# Patient Record
Sex: FEMALE | Race: WHITE | Employment: FULL TIME | URBAN - METROPOLITAN AREA
[De-identification: names, ages, dates, MRNs, and addresses within clinical notes are randomized per-mention and may not be internally consistent; named-entity substitution may affect disease eponyms.]

---

## 2017-01-01 ENCOUNTER — OFFICE VISIT (OUTPATIENT)
Dept: INTERNAL MEDICINE | Age: 57
End: 2017-01-01

## 2017-01-01 VITALS
WEIGHT: 228 LBS | TEMPERATURE: 97.8 F | OXYGEN SATURATION: 98 % | RESPIRATION RATE: 20 BRPM | HEIGHT: 62 IN | SYSTOLIC BLOOD PRESSURE: 118 MMHG | HEART RATE: 71 BPM | BODY MASS INDEX: 41.96 KG/M2 | DIASTOLIC BLOOD PRESSURE: 72 MMHG

## 2017-01-01 DIAGNOSIS — L20.89 OTHER ATOPIC DERMATITIS: ICD-10-CM

## 2017-01-01 DIAGNOSIS — Z13.1 ENCOUNTER FOR SCREENING FOR DIABETES MELLITUS: ICD-10-CM

## 2017-01-01 DIAGNOSIS — Z23 NEED FOR PROPHYLACTIC VACCINATION AGAINST STREPTOCOCCUS PNEUMONIAE (PNEUMOCOCCUS): ICD-10-CM

## 2017-01-01 DIAGNOSIS — Z12.31 ENCOUNTER FOR SCREENING MAMMOGRAM FOR BREAST CANCER: ICD-10-CM

## 2017-01-01 DIAGNOSIS — Z12.11 SCREEN FOR COLON CANCER: Primary | ICD-10-CM

## 2017-01-01 DIAGNOSIS — B35.4 TINEA CORPORIS: ICD-10-CM

## 2017-01-01 DIAGNOSIS — M54.42 ACUTE LEFT-SIDED LOW BACK PAIN WITH LEFT-SIDED SCIATICA: ICD-10-CM

## 2017-01-01 DIAGNOSIS — R73.09 ELEVATED GLUCOSE LEVEL: ICD-10-CM

## 2017-01-01 DIAGNOSIS — Z87.891 PERSONAL HISTORY OF TOBACCO USE: ICD-10-CM

## 2017-01-01 DIAGNOSIS — F17.219 NICOTINE DEPENDENCE, CIGARETTES, WITH UNSPECIFIED NICOTINE-INDUCED DISORDERS: ICD-10-CM

## 2017-01-01 LAB
GLUCOSE FASTING: 108 MG/DL (ref 74–109)
HBA1C MFR BLD: 6.9 % (ref 4.8–5.9)

## 2017-01-01 PROCEDURE — G8482 FLU IMMUNIZE ORDER/ADMIN: HCPCS | Performed by: PHYSICIAN ASSISTANT

## 2017-01-01 PROCEDURE — 3014F SCREEN MAMMO DOC REV: CPT | Performed by: PHYSICIAN ASSISTANT

## 2017-01-01 PROCEDURE — 4004F PT TOBACCO SCREEN RCVD TLK: CPT | Performed by: PHYSICIAN ASSISTANT

## 2017-01-01 PROCEDURE — G8427 DOCREV CUR MEDS BY ELIG CLIN: HCPCS | Performed by: PHYSICIAN ASSISTANT

## 2017-01-01 PROCEDURE — 99214 OFFICE O/P EST MOD 30 MIN: CPT | Performed by: PHYSICIAN ASSISTANT

## 2017-01-01 PROCEDURE — G8417 CALC BMI ABV UP PARAM F/U: HCPCS | Performed by: PHYSICIAN ASSISTANT

## 2017-01-01 PROCEDURE — G0296 VISIT TO DETERM LDCT ELIG: HCPCS | Performed by: PHYSICIAN ASSISTANT

## 2017-01-01 PROCEDURE — 3017F COLORECTAL CA SCREEN DOC REV: CPT | Performed by: PHYSICIAN ASSISTANT

## 2017-01-01 RX ORDER — HYDROCODONE BITARTRATE AND ACETAMINOPHEN 5; 325 MG/1; MG/1
1 TABLET ORAL EVERY 6 HOURS PRN
Qty: 20 TABLET | Refills: 0 | Status: SHIPPED | OUTPATIENT
Start: 2017-01-01 | End: 2017-01-01

## 2017-01-01 RX ORDER — METHYLPREDNISOLONE 4 MG/1
TABLET ORAL
Qty: 1 KIT | Refills: 0 | Status: SHIPPED | OUTPATIENT
Start: 2017-01-01 | End: 2017-01-01

## 2017-01-01 RX ORDER — NYSTATIN 100000 U/G
CREAM TOPICAL
Qty: 45 G | Refills: 1 | Status: SHIPPED | OUTPATIENT
Start: 2017-01-01 | End: 2018-01-01 | Stop reason: ALTCHOICE

## 2017-01-01 ASSESSMENT — ENCOUNTER SYMPTOMS
SINUS PAIN: 0
SORE THROAT: 0
VOMITING: 0
NAUSEA: 0
COUGH: 1
DOUBLE VISION: 0
BACK PAIN: 1
EYE PAIN: 0
WHEEZING: 1
ABDOMINAL PAIN: 0
DIARRHEA: 0

## 2017-01-01 ASSESSMENT — PATIENT HEALTH QUESTIONNAIRE - PHQ9
SUM OF ALL RESPONSES TO PHQ9 QUESTIONS 1 & 2: 0
SUM OF ALL RESPONSES TO PHQ QUESTIONS 1-9: 0
1. LITTLE INTEREST OR PLEASURE IN DOING THINGS: 0
2. FEELING DOWN, DEPRESSED OR HOPELESS: 0

## 2017-12-06 NOTE — PROGRESS NOTES
Subjective  Versa [place], 62 y.o. female presents today with:  Chief Complaint   Patient presents with    Back Pain     Patient here following up from [facility] 12/2/17. Left sided. Rx naproxen and flexeril  Patient states she is still having pain.  Leg Pain     burning and stinging pain on right thigh, state over 1 year. Now it is consent.  Health Maintenance     FIT, GLucose, Pneumonia Vaccine and mammogram, CT lung       HPI   Pt is here for urgent care f.u on low back pain with radiation to left le. rx'd muscle relaxer and naproxen. She presently grades her pain 6 out of 10. Denies buckling of her legs. 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 States she is not ready to quit  Complaining of a red itchy rash on her right anterior thigh and beneath her breast  Review of Systems   Constitutional: Negative for diaphoresis and weight loss. HENT: Negative for congestion, sinus pain and sore throat. Eyes: Negative for double vision and pain. Respiratory: Positive for cough and wheezing. Cardiovascular: Negative for chest pain and palpitations. Gastrointestinal: Negative for abdominal pain, diarrhea, nausea and vomiting. Genitourinary: Negative for dysuria and frequency. Musculoskeletal: Positive for back pain and joint pain. Skin: Positive for rash. Negative for itching. Neurological: Positive for tingling. Negative for dizziness, tremors and focal weakness. Endo/Heme/Allergies: Negative for environmental allergies. Psychiatric/Behavioral: Negative for depression. The patient is not nervous/anxious.           Past Medical History:   Diagnosis Date    Obesity      Past Surgical History:   Procedure Laterality Date    CARPAL TUNNEL RELEASE Left 04/18/16    Eduardo Oneal MD    CARPAL TUNNEL RELEASE Right 5/9/16    NORMA    CHOLECYSTECTOMY  2008    NASAL POLYP and atraumatic. Eyes: Conjunctivae and EOM are normal. Pupils are equal, round, and reactive to light. Neck: Normal range of motion. Neck supple. Cardiovascular: Normal rate, regular rhythm and normal heart sounds. Pulmonary/Chest: Effort normal and breath sounds normal.   Abdominal: Soft. Bowel sounds are normal.   Musculoskeletal: She exhibits tenderness. She exhibits no edema. Lumbar back: She exhibits tenderness, bony tenderness and spasm. Neurological: She is alert and oriented to person, place, and time. Gait normal.   Skin: Skin is warm and dry. Rash noted. There is erythema. Psychiatric: Mood, memory, affect and judgment normal.            Assessment & Plan   1. Screen for colon cancer  POCT Fecal Immunochemical Test (FIT)   2. Encounter for screening mammogram for breast cancer  SADIE Digital Screen Bilateral [[id number]]   3. Encounter for screening for diabetes mellitus  Glucose, Fasting   4. Need for prophylactic vaccination against Streptococcus pneumoniae (pneumococcus)  CANCELED: Pneumococcal polysaccharide vaccine 23-valent PPSV23   5. Nicotine dependence, cigarettes, with unspecified nicotine-induced disorders  NV VISIT TO DISCUSS LUNG CA SCREEN W LDCT    CT Lung Screening   6. Personal history of tobacco use  NV VISIT TO DISCUSS LUNG CA SCREEN W LDCT    CT Lung Screening   7. Other atopic dermatitis     8. Tinea corporis     9. Acute left-sided low back pain with left-sided sciatica  XR LUMBAR SPINE (MIN 4 VIEWS)   10. Elevated glucose level  Hemoglobin A1C       Low Dose CT (LDCT) Lung Screening criteria met   Age 50-69   Pack year smoking >30   Still smoking or less than 15 year since quit   No sign or symptoms of lung cancer   > 11 months since last LDCT     Risks and benefits of lung cancer screening with LDCT scans discussed:    Significance of positive screen - False-positive LDCT results often occur. 95% of all positive results do not lead to a diagnosis of cancer.  Usually LUMBAR SPINE (MIN 4 VIEWS)     Standing Status:   Future     Standing Expiration Date:   12/6/2018     Order Specific Question:   Reason for exam:     Answer:   pain    Glucose, Fasting     Standing Status:   Future     Standing Expiration Date:   12/6/2018    Hemoglobin A1C     Standing Status:   Future     Standing Expiration Date:   12/6/2018    POCT Fecal Immunochemical Test (FIT)     Standing Status:   Future     Standing Expiration Date:   12/6/2018    MD VISIT TO DISCUSS LUNG CA SCREEN W LDCT     Orders Placed This Encounter   Medications    hydrocortisone 2.5 % cream     Sig: Apply topically 2 times daily. Dispense:  45 g     Refill:  1    nystatin (MYCOSTATIN) 088797 UNIT/GM cream     Sig: Apply topically 2 times daily. Dispense:  45 g     Refill:  1    methylPREDNISolone (MEDROL DOSEPACK) 4 MG tablet     Sig: Take by mouth. Dispense:  1 kit     Refill:  0    HYDROcodone-acetaminophen (NORCO) 5-325 MG per tablet     Sig: Take 1 tablet by mouth every 6 hours as needed for Pain . Dispense:  20 tablet     Refill:  0     There are no discontinued medications. Return in about 6 months (around 6/6/2018), or if symptoms worsen or fail to improve, for call for results.     Sarah Garcia PA-C

## 2018-01-01 ENCOUNTER — APPOINTMENT (OUTPATIENT)
Dept: CT IMAGING | Age: 58
End: 2018-01-01
Payer: COMMERCIAL

## 2018-01-01 ENCOUNTER — HOSPITAL ENCOUNTER (OUTPATIENT)
Dept: CT IMAGING | Age: 58
Discharge: HOME | DRG: 952 | End: 2018-03-10
Payer: COMMERCIAL

## 2018-01-01 ENCOUNTER — HOSPITAL ENCOUNTER (OUTPATIENT)
Dept: CT IMAGING | Age: 58
Discharge: HOME | End: 2018-01-12
Payer: COMMERCIAL

## 2018-01-01 ENCOUNTER — TELEPHONE (OUTPATIENT)
Dept: INTERNAL MEDICINE CLINIC | Age: 58
End: 2018-01-01

## 2018-01-01 ENCOUNTER — INITIAL CONSULT (OUTPATIENT)
Dept: INTERVENTIONAL RADIOLOGY/VASCULAR | Age: 58
End: 2018-01-01
Payer: COMMERCIAL

## 2018-01-01 ENCOUNTER — HOSPITAL ENCOUNTER (OUTPATIENT)
Age: 58
Setting detail: OUTPATIENT SURGERY
Discharge: HOME | End: 2018-02-08
Attending: SPECIALIST | Admitting: SPECIALIST
Payer: COMMERCIAL

## 2018-01-01 ENCOUNTER — APPOINTMENT (OUTPATIENT)
Dept: GENERAL RADIOLOGY | Age: 58
End: 2018-01-01
Payer: COMMERCIAL

## 2018-01-01 ENCOUNTER — HOSPITAL ENCOUNTER (OUTPATIENT)
Dept: WOMENS IMAGING | Age: 58
Discharge: HOME | End: 2018-01-12
Payer: COMMERCIAL

## 2018-01-01 ENCOUNTER — HOSPITAL ENCOUNTER (OUTPATIENT)
Dept: GENERAL RADIOLOGY | Age: 58
Discharge: HOME | End: 2018-01-12
Payer: COMMERCIAL

## 2018-01-01 ENCOUNTER — APPOINTMENT (OUTPATIENT)
Dept: CT IMAGING | Age: 58
DRG: 952 | End: 2018-01-01
Payer: COMMERCIAL

## 2018-01-01 ENCOUNTER — APPOINTMENT (OUTPATIENT)
Dept: GENERAL RADIOLOGY | Age: 58
DRG: 952 | End: 2018-01-01
Payer: COMMERCIAL

## 2018-01-01 ENCOUNTER — TELEPHONE (OUTPATIENT)
Dept: CASE MANAGEMENT | Age: 58
End: 2018-01-01

## 2018-01-01 ENCOUNTER — HOSPITAL ENCOUNTER (EMERGENCY)
Age: 58
Discharge: HOME | End: 2018-01-23
Payer: COMMERCIAL

## 2018-01-01 ENCOUNTER — ANESTHESIA (OUTPATIENT)
Dept: ENDOSCOPY | Age: 58
End: 2018-01-01
Payer: COMMERCIAL

## 2018-01-01 ENCOUNTER — OFFICE VISIT (OUTPATIENT)
Dept: INTERNAL MEDICINE | Age: 58
End: 2018-01-01

## 2018-01-01 ENCOUNTER — HOSPITAL ENCOUNTER (OUTPATIENT)
Dept: ULTRASOUND IMAGING | Age: 58
Discharge: HOME | DRG: 952 | End: 2018-03-11
Payer: COMMERCIAL

## 2018-01-01 ENCOUNTER — HOSPITAL ENCOUNTER (INPATIENT)
Age: 58
LOS: 4 days | Discharge: HOSPICE | DRG: 952 | End: 2018-03-15
Attending: STUDENT IN AN ORGANIZED HEALTH CARE EDUCATION/TRAINING PROGRAM | Admitting: INTERNAL MEDICINE
Payer: COMMERCIAL

## 2018-01-01 ENCOUNTER — OFFICE VISIT (OUTPATIENT)
Dept: INTERNAL MEDICINE CLINIC | Age: 58
End: 2018-01-01
Payer: COMMERCIAL

## 2018-01-01 ENCOUNTER — HOSPITAL ENCOUNTER (OUTPATIENT)
Dept: CT IMAGING | Age: 58
Discharge: HOME | DRG: 952 | End: 2018-03-11
Payer: COMMERCIAL

## 2018-01-01 ENCOUNTER — HOSPITAL ENCOUNTER (EMERGENCY)
Age: 58
Discharge: HOME | End: 2018-02-20
Attending: EMERGENCY MEDICINE
Payer: COMMERCIAL

## 2018-01-01 ENCOUNTER — ANESTHESIA EVENT (OUTPATIENT)
Dept: ENDOSCOPY | Age: 58
End: 2018-01-01
Payer: COMMERCIAL

## 2018-01-01 VITALS
RESPIRATION RATE: 20 BRPM | BODY MASS INDEX: 42.1 KG/M2 | OXYGEN SATURATION: 98 % | HEART RATE: 78 BPM | TEMPERATURE: 97.6 F | HEIGHT: 61 IN | SYSTOLIC BLOOD PRESSURE: 144 MMHG | WEIGHT: 223 LBS | DIASTOLIC BLOOD PRESSURE: 68 MMHG

## 2018-01-01 VITALS
OXYGEN SATURATION: 95 % | BODY MASS INDEX: 38.64 KG/M2 | WEIGHT: 210 LBS | HEIGHT: 62 IN | TEMPERATURE: 97.8 F | DIASTOLIC BLOOD PRESSURE: 70 MMHG | SYSTOLIC BLOOD PRESSURE: 128 MMHG | RESPIRATION RATE: 16 BRPM | HEART RATE: 92 BPM

## 2018-01-01 VITALS
RESPIRATION RATE: 24 BRPM | DIASTOLIC BLOOD PRESSURE: 76 MMHG | BODY MASS INDEX: 39.92 KG/M2 | OXYGEN SATURATION: 97 % | TEMPERATURE: 97.7 F | SYSTOLIC BLOOD PRESSURE: 147 MMHG | HEART RATE: 97 BPM | WEIGHT: 211.42 LBS | HEIGHT: 61 IN

## 2018-01-01 VITALS
OXYGEN SATURATION: 97 % | DIASTOLIC BLOOD PRESSURE: 78 MMHG | SYSTOLIC BLOOD PRESSURE: 128 MMHG | TEMPERATURE: 98.1 F | WEIGHT: 225 LBS | BODY MASS INDEX: 41.41 KG/M2 | HEART RATE: 79 BPM | RESPIRATION RATE: 16 BRPM | HEIGHT: 62 IN

## 2018-01-01 VITALS
OXYGEN SATURATION: 97 % | HEART RATE: 90 BPM | SYSTOLIC BLOOD PRESSURE: 112 MMHG | BODY MASS INDEX: 39.04 KG/M2 | RESPIRATION RATE: 16 BRPM | WEIGHT: 210 LBS | DIASTOLIC BLOOD PRESSURE: 82 MMHG

## 2018-01-01 VITALS
OXYGEN SATURATION: 99 % | RESPIRATION RATE: 18 BRPM | TEMPERATURE: 98.6 F | DIASTOLIC BLOOD PRESSURE: 49 MMHG | HEART RATE: 70 BPM | BODY MASS INDEX: 40.22 KG/M2 | WEIGHT: 213 LBS | HEIGHT: 61 IN | SYSTOLIC BLOOD PRESSURE: 117 MMHG

## 2018-01-01 VITALS
WEIGHT: 210 LBS | DIASTOLIC BLOOD PRESSURE: 95 MMHG | RESPIRATION RATE: 16 BRPM | SYSTOLIC BLOOD PRESSURE: 139 MMHG | HEART RATE: 111 BPM | HEIGHT: 62 IN | BODY MASS INDEX: 38.64 KG/M2

## 2018-01-01 VITALS
OXYGEN SATURATION: 99 % | RESPIRATION RATE: 17 BRPM | DIASTOLIC BLOOD PRESSURE: 64 MMHG | SYSTOLIC BLOOD PRESSURE: 110 MMHG

## 2018-01-01 VITALS
BODY MASS INDEX: 38.83 KG/M2 | SYSTOLIC BLOOD PRESSURE: 117 MMHG | DIASTOLIC BLOOD PRESSURE: 77 MMHG | TEMPERATURE: 98.2 F | OXYGEN SATURATION: 97 % | WEIGHT: 211 LBS | RESPIRATION RATE: 18 BRPM | HEIGHT: 62 IN | HEART RATE: 77 BPM

## 2018-01-01 VITALS
RESPIRATION RATE: 24 BRPM | BODY MASS INDEX: 39.04 KG/M2 | TEMPERATURE: 97.5 F | HEART RATE: 94 BPM | DIASTOLIC BLOOD PRESSURE: 79 MMHG | OXYGEN SATURATION: 96 % | WEIGHT: 210 LBS | SYSTOLIC BLOOD PRESSURE: 146 MMHG

## 2018-01-01 VITALS
RESPIRATION RATE: 18 BRPM | SYSTOLIC BLOOD PRESSURE: 117 MMHG | HEART RATE: 79 BPM | DIASTOLIC BLOOD PRESSURE: 79 MMHG | WEIGHT: 223 LBS | BODY MASS INDEX: 41.04 KG/M2 | HEIGHT: 62 IN

## 2018-01-01 DIAGNOSIS — R73.09 ELEVATED GLUCOSE LEVEL: ICD-10-CM

## 2018-01-01 DIAGNOSIS — C80.1 CANCER (HCC): ICD-10-CM

## 2018-01-01 DIAGNOSIS — M54.42 ACUTE LEFT-SIDED LOW BACK PAIN WITH LEFT-SIDED SCIATICA: ICD-10-CM

## 2018-01-01 DIAGNOSIS — E87.1 HYPONATREMIA: Primary | ICD-10-CM

## 2018-01-01 DIAGNOSIS — G89.29 CHRONIC ABDOMINAL PAIN: ICD-10-CM

## 2018-01-01 DIAGNOSIS — J20.9 ACUTE BRONCHITIS, UNSPECIFIED ORGANISM: Primary | ICD-10-CM

## 2018-01-01 DIAGNOSIS — R92.8 ABNORMAL MAMMOGRAM OF LEFT BREAST: Primary | ICD-10-CM

## 2018-01-01 DIAGNOSIS — R10.9 ABDOMINAL PAIN, UNSPECIFIED ABDOMINAL LOCATION: Primary | ICD-10-CM

## 2018-01-01 DIAGNOSIS — Z12.31 ENCOUNTER FOR SCREENING MAMMOGRAM FOR BREAST CANCER: ICD-10-CM

## 2018-01-01 DIAGNOSIS — R79.89 ELEVATED LACTIC ACID LEVEL: ICD-10-CM

## 2018-01-01 DIAGNOSIS — R59.9 ENLARGED LYMPH NODE: ICD-10-CM

## 2018-01-01 DIAGNOSIS — R16.0 LIVER MASS: ICD-10-CM

## 2018-01-01 DIAGNOSIS — K74.60 CIRRHOSIS OF LIVER WITHOUT ASCITES, UNSPECIFIED HEPATIC CIRRHOSIS TYPE (HCC): Primary | ICD-10-CM

## 2018-01-01 DIAGNOSIS — Z87.891 PERSONAL HISTORY OF TOBACCO USE: ICD-10-CM

## 2018-01-01 DIAGNOSIS — K27.9 PUD (PEPTIC ULCER DISEASE): ICD-10-CM

## 2018-01-01 DIAGNOSIS — R59.1 LYMPHADENOPATHY: ICD-10-CM

## 2018-01-01 DIAGNOSIS — F17.219 NICOTINE DEPENDENCE, CIGARETTES, WITH UNSPECIFIED NICOTINE-INDUCED DISORDERS: ICD-10-CM

## 2018-01-01 DIAGNOSIS — E80.6 HYPERBILIRUBINEMIA: ICD-10-CM

## 2018-01-01 DIAGNOSIS — R11.2 NON-INTRACTABLE VOMITING WITH NAUSEA, UNSPECIFIED VOMITING TYPE: ICD-10-CM

## 2018-01-01 DIAGNOSIS — E86.0 DEHYDRATION: ICD-10-CM

## 2018-01-01 DIAGNOSIS — M54.42 ACUTE LEFT-SIDED LOW BACK PAIN WITH LEFT-SIDED SCIATICA: Primary | ICD-10-CM

## 2018-01-01 DIAGNOSIS — R63.8 POOR FLUID INTAKE: ICD-10-CM

## 2018-01-01 DIAGNOSIS — K56.41 FECAL IMPACTION OF COLON (HCC): ICD-10-CM

## 2018-01-01 DIAGNOSIS — R10.9 CHRONIC ABDOMINAL PAIN: ICD-10-CM

## 2018-01-01 DIAGNOSIS — R16.0 LIVER MASSES: ICD-10-CM

## 2018-01-01 LAB
AFP-TUMOR MARKER: 2 NG/ML (ref 0–9)
ALBUMIN SERPL-MCNC: 2.5 G/DL (ref 3.9–4.9)
ALBUMIN SERPL-MCNC: 2.8 G/DL (ref 3.9–4.9)
ALBUMIN SERPL-MCNC: 2.8 G/DL (ref 3.9–4.9)
ALBUMIN SERPL-MCNC: 3.1 G/DL (ref 3.9–4.9)
ALBUMIN SERPL-MCNC: 3.1 G/DL (ref 3.9–4.9)
ALBUMIN SERPL-MCNC: 3.4 G/DL (ref 3.9–4.9)
ALBUMIN SERPL-MCNC: 3.7 G/DL (ref 3.9–4.9)
ALBUMIN SERPL-MCNC: 3.8 G/DL (ref 3.9–4.9)
ALP BLD-CCNC: 215 U/L (ref 40–130)
ALP BLD-CCNC: 245 U/L (ref 40–130)
ALP BLD-CCNC: 266 U/L (ref 40–130)
ALP BLD-CCNC: 273 U/L (ref 40–130)
ALP BLD-CCNC: 308 U/L (ref 40–130)
ALP BLD-CCNC: 310 U/L (ref 40–130)
ALP BLD-CCNC: 323 U/L (ref 40–130)
ALP BLD-CCNC: 334 U/L (ref 40–130)
ALPHA-1 ANTITRYPSIN: 222 MG/DL (ref 90–200)
ALT SERPL-CCNC: 102 U/L (ref 0–33)
ALT SERPL-CCNC: 103 U/L (ref 0–33)
ALT SERPL-CCNC: 121 U/L (ref 0–33)
ALT SERPL-CCNC: 178 U/L (ref 0–33)
ALT SERPL-CCNC: 92 U/L (ref 0–33)
ALT SERPL-CCNC: 96 U/L (ref 0–33)
ALT SERPL-CCNC: 96 U/L (ref 0–33)
ALT SERPL-CCNC: 98 U/L (ref 0–33)
AMMONIA: ABNORMAL UMOL/L (ref 11–51)
ANA INTERPRETATION: NORMAL
ANION GAP SERPL CALCULATED.3IONS-SCNC: 13 MEQ/L (ref 7–13)
ANION GAP SERPL CALCULATED.3IONS-SCNC: 17 MEQ/L (ref 7–13)
ANION GAP SERPL CALCULATED.3IONS-SCNC: 20 MEQ/L (ref 7–13)
ANION GAP SERPL CALCULATED.3IONS-SCNC: 21 MEQ/L (ref 7–13)
ANION GAP SERPL CALCULATED.3IONS-SCNC: 21 MEQ/L (ref 7–13)
ANION GAP SERPL CALCULATED.3IONS-SCNC: 25 MEQ/L (ref 7–13)
ANION GAP SERPL CALCULATED.3IONS-SCNC: 32 MEQ/L (ref 7–13)
ANTI-NUCLEAR ANTIBODY (ANA): NEGATIVE
APTT: 51.1 SEC (ref 21.6–35.4)
APTT: 58.8 SEC (ref 21.6–35.4)
AST SERPL-CCNC: 108 U/L (ref 0–35)
AST SERPL-CCNC: 139 U/L (ref 0–35)
AST SERPL-CCNC: 237 U/L (ref 0–35)
AST SERPL-CCNC: 414 U/L (ref 0–35)
AST SERPL-CCNC: 436 U/L (ref 0–35)
AST SERPL-CCNC: 462 U/L (ref 0–35)
AST SERPL-CCNC: 516 U/L (ref 0–35)
AST SERPL-CCNC: 923 U/L (ref 0–35)
BACTERIA: ABNORMAL /HPF
BASOPHILS ABSOLUTE: 0 K/UL (ref 0–0.2)
BASOPHILS ABSOLUTE: 0.1 K/UL (ref 0–0.2)
BASOPHILS ABSOLUTE: 0.1 K/UL (ref 0–0.2)
BASOPHILS RELATIVE PERCENT: 0 %
BASOPHILS RELATIVE PERCENT: 0.1 %
BASOPHILS RELATIVE PERCENT: 0.3 %
BASOPHILS RELATIVE PERCENT: 0.6 %
BASOPHILS RELATIVE PERCENT: 0.8 %
BASOPHILS RELATIVE PERCENT: 0.9 %
BILIRUB SERPL-MCNC: 0.7 MG/DL (ref 0–1.2)
BILIRUB SERPL-MCNC: 0.9 MG/DL (ref 0–1.2)
BILIRUB SERPL-MCNC: 25.4 MG/DL (ref 0–1.2)
BILIRUB SERPL-MCNC: 26.1 MG/DL (ref 0–1.2)
BILIRUB SERPL-MCNC: 26.1 MG/DL (ref 0–1.2)
BILIRUB SERPL-MCNC: 3.1 MG/DL (ref 0–1.2)
BILIRUB SERPL-MCNC: 31.5 MG/DL (ref 0–1.2)
BILIRUB SERPL-MCNC: 37.2 MG/DL (ref 0–1.2)
BILIRUBIN DIRECT: 0.5 MG/DL (ref 0–0.3)
BILIRUBIN DIRECT: 19.6 MG/DL (ref 0–0.3)
BILIRUBIN DIRECT: 2.2 MG/DL (ref 0–0.3)
BILIRUBIN URINE: ABNORMAL
BILIRUBIN, INDIRECT: 0.4 MG/DL (ref 0–0.6)
BILIRUBIN, INDIRECT: 0.9 MG/DL (ref 0–0.6)
BILIRUBIN, INDIRECT: 5.8 MG/DL (ref 0–0.6)
BLOOD CULTURE, ROUTINE: NORMAL
BLOOD, URINE: NEGATIVE
BUN BLDV-MCNC: 13 MG/DL (ref 6–20)
BUN BLDV-MCNC: 13 MG/DL (ref 6–20)
BUN BLDV-MCNC: 14 MG/DL (ref 6–20)
BUN BLDV-MCNC: 14 MG/DL (ref 6–20)
BUN BLDV-MCNC: 16 MG/DL (ref 6–20)
BUN BLDV-MCNC: 19 MG/DL (ref 6–20)
BUN BLDV-MCNC: 34 MG/DL (ref 6–20)
CA 19-9: 8514 U/ML (ref 0–37)
CA 27.29: 1705.6 U/ML (ref 0–40)
CALCIUM SERPL-MCNC: 10.1 MG/DL (ref 8.6–10.2)
CALCIUM SERPL-MCNC: 8.2 MG/DL (ref 8.6–10.2)
CALCIUM SERPL-MCNC: 8.4 MG/DL (ref 8.6–10.2)
CALCIUM SERPL-MCNC: 9.1 MG/DL (ref 8.6–10.2)
CALCIUM SERPL-MCNC: 9.1 MG/DL (ref 8.6–10.2)
CALCIUM SERPL-MCNC: 9.2 MG/DL (ref 8.6–10.2)
CALCIUM SERPL-MCNC: 9.3 MG/DL (ref 8.6–10.2)
CASTS 2: ABNORMAL /LPF
CASTS: ABNORMAL /LPF
CEA: 1967 NG/ML (ref 0–5.5)
CERULOPLASMIN: 34 MG/DL (ref 17–54)
CHLORIDE BLD-SCNC: 102 MEQ/L (ref 98–107)
CHLORIDE BLD-SCNC: 86 MEQ/L (ref 98–107)
CHLORIDE BLD-SCNC: 88 MEQ/L (ref 98–107)
CHLORIDE BLD-SCNC: 90 MEQ/L (ref 98–107)
CHLORIDE BLD-SCNC: 93 MEQ/L (ref 98–107)
CHLORIDE BLD-SCNC: 93 MEQ/L (ref 98–107)
CHLORIDE BLD-SCNC: 96 MEQ/L (ref 98–107)
CLARITY: ABNORMAL
CO2: 12 MEQ/L (ref 22–29)
CO2: 16 MEQ/L (ref 22–29)
CO2: 17 MEQ/L (ref 22–29)
CO2: 18 MEQ/L (ref 22–29)
CO2: 21 MEQ/L (ref 22–29)
CO2: 23 MEQ/L (ref 22–29)
CO2: 9 MEQ/L (ref 22–29)
COLOR: ABNORMAL
COPPER: 143 UG/DL (ref 80–155)
CREAT SERPL-MCNC: 0.46 MG/DL (ref 0.5–0.9)
CREAT SERPL-MCNC: 0.47 MG/DL (ref 0.5–0.9)
CREAT SERPL-MCNC: 0.51 MG/DL (ref 0.5–0.9)
CREAT SERPL-MCNC: 0.55 MG/DL (ref 0.5–0.9)
CREAT SERPL-MCNC: 0.75 MG/DL (ref 0.5–0.9)
CREATININE URINE: 120 MG/DL
CULTURE, BLOOD 2: NORMAL
EOSINOPHILS ABSOLUTE: 0 K/UL (ref 0–0.7)
EOSINOPHILS ABSOLUTE: 0.1 K/UL (ref 0–0.7)
EOSINOPHILS ABSOLUTE: 0.1 K/UL (ref 0–0.7)
EOSINOPHILS ABSOLUTE: 0.3 K/UL (ref 0–0.7)
EOSINOPHILS RELATIVE PERCENT: 0.2 %
EOSINOPHILS RELATIVE PERCENT: 0.2 %
EOSINOPHILS RELATIVE PERCENT: 0.4 %
EOSINOPHILS RELATIVE PERCENT: 0.8 %
EOSINOPHILS RELATIVE PERCENT: 1.7 %
EOSINOPHILS RELATIVE PERCENT: 4.1 %
EPITHELIAL CELLS, UA: ABNORMAL /HPF
FERRITIN: 450.5 NG/ML (ref 13–150)
GFR AFRICAN AMERICAN: >60
GFR NON-AFRICAN AMERICAN: >60
GLOBULIN: 2.7 G/DL (ref 2.3–3.5)
GLOBULIN: 2.7 G/DL (ref 2.3–3.5)
GLOBULIN: 2.8 G/DL (ref 2.3–3.5)
GLOBULIN: 2.8 G/DL (ref 2.3–3.5)
GLOBULIN: 3.1 G/DL (ref 2.3–3.5)
GLOBULIN: 3.6 G/DL (ref 2.3–3.5)
GLUCOSE BLD-MCNC: 124 MG/DL (ref 74–109)
GLUCOSE BLD-MCNC: 64 MG/DL (ref 74–109)
GLUCOSE BLD-MCNC: 64 MG/DL (ref 74–109)
GLUCOSE BLD-MCNC: 68 MG/DL (ref 74–109)
GLUCOSE BLD-MCNC: 70 MG/DL (ref 74–109)
GLUCOSE BLD-MCNC: 82 MG/DL (ref 74–109)
GLUCOSE BLD-MCNC: 98 MG/DL (ref 74–109)
GLUCOSE URINE: NEGATIVE MG/DL
HCT VFR BLD CALC: 39.4 % (ref 37–47)
HCT VFR BLD CALC: 40.4 % (ref 37–47)
HCT VFR BLD CALC: 40.5 % (ref 37–47)
HCT VFR BLD CALC: 40.6 % (ref 37–47)
HCT VFR BLD CALC: 41 % (ref 37–47)
HCT VFR BLD CALC: 43.7 % (ref 37–47)
HCT VFR BLD CALC: 44.8 % (ref 37–47)
HEMOGLOBIN: 13.2 G/DL (ref 12–16)
HEMOGLOBIN: 13.3 G/DL (ref 12–16)
HEMOGLOBIN: 13.3 G/DL (ref 12–16)
HEMOGLOBIN: 13.5 G/DL (ref 12–16)
HEMOGLOBIN: 14.1 G/DL (ref 12–16)
HEMOGLOBIN: 14.5 G/DL (ref 12–16)
HEMOGLOBIN: 15.1 G/DL (ref 12–16)
HEPATITIS B SURFACE ANTIGEN INTERPRETATION: NORMAL
HEPATITIS C ANTIBODY INTERPRETATION: NORMAL
INR BLD: 1
INR BLD: 2.3
INR BLD: 2.7
IRON SATURATION: 24 % (ref 11–46)
IRON: 69 UG/DL (ref 37–145)
KETONES, URINE: 15 MG/DL
LACTIC ACID: 7.2 MMOL/L (ref 0.5–2.2)
LACTIC ACID: 7.7 MMOL/L (ref 0.5–2.2)
LACTIC ACID: 8.3 MMOL/L (ref 0.5–2.2)
LACTIC ACID: 9.2 MMOL/L (ref 0.5–2.2)
LEUKOCYTE ESTERASE, URINE: ABNORMAL
LIPASE: 103 U/L (ref 13–60)
LIPASE: 68 U/L (ref 13–60)
LIPASE: 76 U/L (ref 13–60)
LIVER-KIDNEY MICROSOME-1 AB IGG: 1.4 U (ref 0–24.9)
LYMPHOCYTES ABSOLUTE: 0.7 K/UL (ref 1–4.8)
LYMPHOCYTES ABSOLUTE: 0.7 K/UL (ref 1–4.8)
LYMPHOCYTES ABSOLUTE: 0.9 K/UL (ref 1–4.8)
LYMPHOCYTES ABSOLUTE: 0.9 K/UL (ref 1–4.8)
LYMPHOCYTES ABSOLUTE: 1.3 K/UL (ref 1–4.8)
LYMPHOCYTES ABSOLUTE: 1.5 K/UL (ref 1–4.8)
LYMPHOCYTES RELATIVE PERCENT: 20.3 %
LYMPHOCYTES RELATIVE PERCENT: 21.3 %
LYMPHOCYTES RELATIVE PERCENT: 6.3 %
LYMPHOCYTES RELATIVE PERCENT: 7 %
LYMPHOCYTES RELATIVE PERCENT: 8.3 %
LYMPHOCYTES RELATIVE PERCENT: 8.5 %
MCH RBC QN AUTO: 31.6 PG (ref 27–31.3)
MCH RBC QN AUTO: 33.2 PG (ref 27–31.3)
MCH RBC QN AUTO: 34.4 PG (ref 27–31.3)
MCH RBC QN AUTO: 34.5 PG (ref 27–31.3)
MCH RBC QN AUTO: 34.8 PG (ref 27–31.3)
MCH RBC QN AUTO: 35.1 PG (ref 27–31.3)
MCH RBC QN AUTO: 35.4 PG (ref 27–31.3)
MCHC RBC AUTO-ENTMCNC: 32.8 % (ref 33–37)
MCHC RBC AUTO-ENTMCNC: 32.8 % (ref 33–37)
MCHC RBC AUTO-ENTMCNC: 33.2 % (ref 33–37)
MCHC RBC AUTO-ENTMCNC: 33.3 % (ref 33–37)
MCHC RBC AUTO-ENTMCNC: 33.7 % (ref 33–37)
MCHC RBC AUTO-ENTMCNC: 33.8 % (ref 33–37)
MCHC RBC AUTO-ENTMCNC: 34.3 % (ref 33–37)
MCV RBC AUTO: 101.9 FL (ref 82–100)
MCV RBC AUTO: 101.9 FL (ref 82–100)
MCV RBC AUTO: 102.3 FL (ref 82–100)
MCV RBC AUTO: 104.6 FL (ref 82–100)
MCV RBC AUTO: 108 FL (ref 82–100)
MCV RBC AUTO: 96.5 FL (ref 82–100)
MCV RBC AUTO: 99.8 FL (ref 82–100)
MITOCHONDRIAL M2 AB, IGG: 7.9 UNITS (ref 0–20)
MONOCYTES ABSOLUTE: 0.5 K/UL (ref 0.2–0.8)
MONOCYTES ABSOLUTE: 0.5 K/UL (ref 0.2–0.8)
MONOCYTES ABSOLUTE: 0.9 K/UL (ref 0.2–0.8)
MONOCYTES ABSOLUTE: 1 K/UL (ref 0.2–0.8)
MONOCYTES ABSOLUTE: 1 K/UL (ref 0.2–0.8)
MONOCYTES ABSOLUTE: 1.4 K/UL (ref 0.2–0.8)
MONOCYTES RELATIVE PERCENT: 10.7 %
MONOCYTES RELATIVE PERCENT: 11.3 %
MONOCYTES RELATIVE PERCENT: 7.5 %
MONOCYTES RELATIVE PERCENT: 8.8 %
MONOCYTES RELATIVE PERCENT: 9 %
MONOCYTES RELATIVE PERCENT: 9.9 %
MUCUS: PRESENT
NEUTROPHILS ABSOLUTE: 11 K/UL (ref 1.4–6.5)
NEUTROPHILS ABSOLUTE: 4.2 K/UL (ref 1.4–6.5)
NEUTROPHILS ABSOLUTE: 4.8 K/UL (ref 1.4–6.5)
NEUTROPHILS ABSOLUTE: 6.3 K/UL (ref 1.4–6.5)
NEUTROPHILS ABSOLUTE: 8.7 K/UL (ref 1.4–6.5)
NEUTROPHILS ABSOLUTE: 8.8 K/UL (ref 1.4–6.5)
NEUTROPHILS RELATIVE PERCENT: 67.2 %
NEUTROPHILS RELATIVE PERCENT: 67.6 %
NEUTROPHILS RELATIVE PERCENT: 78.8 %
NEUTROPHILS RELATIVE PERCENT: 81.8 %
NEUTROPHILS RELATIVE PERCENT: 82 %
NEUTROPHILS RELATIVE PERCENT: 83.6 %
NITRITE, URINE: POSITIVE
OSMOLALITY URINE: 653 MOSM/KG (ref 300–900)
OSMOLALITY: 284 MOSM/KG (ref 280–303)
PDW BLD-RTO: 13.6 % (ref 11.5–14.5)
PDW BLD-RTO: 14.7 % (ref 11.5–14.5)
PDW BLD-RTO: 19.6 % (ref 11.5–14.5)
PDW BLD-RTO: 20.1 % (ref 11.5–14.5)
PDW BLD-RTO: 20.3 % (ref 11.5–14.5)
PDW BLD-RTO: 21.7 % (ref 11.5–14.5)
PDW BLD-RTO: 21.9 % (ref 11.5–14.5)
PERFORMED ON: ABNORMAL
PH UA: 5.5 (ref 5–9)
PLATELET # BLD: 101 K/UL (ref 130–400)
PLATELET # BLD: 101 K/UL (ref 130–400)
PLATELET # BLD: 115 K/UL (ref 130–400)
PLATELET # BLD: 119 K/UL (ref 130–400)
PLATELET # BLD: 91 K/UL (ref 130–400)
PLATELET # BLD: 97 K/UL (ref 130–400)
PLATELET # BLD: 99 K/UL (ref 130–400)
PLATELET SLIDE REVIEW: ABNORMAL
PLATELET SLIDE REVIEW: ABNORMAL
POC CREATININE WHOLE BLOOD: 0.5
POC CREATININE: 0.5 MG/DL (ref 0.6–1.1)
POC SAMPLE TYPE: ABNORMAL
POTASSIUM REFLEX MAGNESIUM: 4.8 MEQ/L (ref 3.5–5.1)
POTASSIUM REFLEX MAGNESIUM: 5 MEQ/L (ref 3.5–5.1)
POTASSIUM REFLEX MAGNESIUM: 5.6 MEQ/L (ref 3.5–5.1)
POTASSIUM REFLEX MAGNESIUM: 6.6 MEQ/L (ref 3.5–5.1)
POTASSIUM SERPL-SCNC: 4.1 MEQ/L (ref 3.5–5.1)
POTASSIUM SERPL-SCNC: 4.3 MEQ/L (ref 3.5–5.1)
POTASSIUM SERPL-SCNC: 5.1 MEQ/L (ref 3.5–5.1)
PRO-BNP: 56 PG/ML
PROTEIN UA: ABNORMAL MG/DL
PROTHROMBIN TIME: 10.8 SEC (ref 8.1–13.7)
PROTHROMBIN TIME: 24.4 SEC (ref 8.1–13.7)
PROTHROMBIN TIME: 28.5 SEC (ref 8.1–13.7)
RBC # BLD: 3.76 M/UL (ref 4.2–5.4)
RBC # BLD: 3.86 M/UL (ref 4.2–5.4)
RBC # BLD: 3.87 M/UL (ref 4.2–5.4)
RBC # BLD: 4.01 M/UL (ref 4.2–5.4)
RBC # BLD: 4.19 M/UL (ref 4.2–5.4)
RBC # BLD: 4.37 M/UL (ref 4.2–5.4)
RBC # BLD: 4.4 M/UL (ref 4.2–5.4)
RBC UA: ABNORMAL /HPF (ref 0–2)
SLIDE REVIEW: ABNORMAL
SLIDE REVIEW: ABNORMAL
SODIUM BLD-SCNC: 125 MEQ/L (ref 132–144)
SODIUM BLD-SCNC: 126 MEQ/L (ref 132–144)
SODIUM BLD-SCNC: 129 MEQ/L (ref 132–144)
SODIUM BLD-SCNC: 130 MEQ/L (ref 132–144)
SODIUM BLD-SCNC: 131 MEQ/L (ref 132–144)
SODIUM BLD-SCNC: 134 MEQ/L (ref 132–144)
SODIUM BLD-SCNC: 138 MEQ/L (ref 132–144)
SODIUM URINE: 20 MEQ/L
SPECIFIC GRAVITY UA: 1.03 (ref 1–1.03)
TOTAL CK: 134 U/L (ref 0–170)
TOTAL IRON BINDING CAPACITY: 291 UG/DL (ref 178–450)
TOTAL PROTEIN: 5.5 G/DL (ref 6.4–8.1)
TOTAL PROTEIN: 5.6 G/DL (ref 6.4–8.1)
TOTAL PROTEIN: 5.9 G/DL (ref 6.4–8.1)
TOTAL PROTEIN: 5.9 G/DL (ref 6.4–8.1)
TOTAL PROTEIN: 6.4 G/DL (ref 6.4–8.1)
TOTAL PROTEIN: 6.4 G/DL (ref 6.4–8.1)
TOTAL PROTEIN: 6.5 G/DL (ref 6.4–8.1)
TOTAL PROTEIN: 6.6 G/DL (ref 6.4–8.1)
TROPONIN: <0.01 NG/ML (ref 0–0.01)
URINE CULTURE, ROUTINE: NORMAL
URINE REFLEX TO CULTURE: YES
UROBILINOGEN, URINE: 1 E.U./DL
WBC # BLD: 10.5 K/UL (ref 4.8–10.8)
WBC # BLD: 10.6 K/UL (ref 4.8–10.8)
WBC # BLD: 13.4 K/UL (ref 4.8–10.8)
WBC # BLD: 6.2 K/UL (ref 4.8–10.8)
WBC # BLD: 7.2 K/UL (ref 4.8–10.8)
WBC # BLD: 8 K/UL (ref 4.8–10.8)
WBC # BLD: 9.3 K/UL (ref 4.8–10.8)
WBC UA: ABNORMAL /HPF (ref 0–5)
WHOPPER PROMPT: NORMAL

## 2018-01-01 PROCEDURE — 6370000000 HC RX 637 (ALT 250 FOR IP): Performed by: INTERNAL MEDICINE

## 2018-01-01 PROCEDURE — 6360000002 HC RX W HCPCS: Performed by: STUDENT IN AN ORGANIZED HEALTH CARE EDUCATION/TRAINING PROGRAM

## 2018-01-01 PROCEDURE — 2580000003 HC RX 258: Performed by: NURSE PRACTITIONER

## 2018-01-01 PROCEDURE — 82140 ASSAY OF AMMONIA: CPT

## 2018-01-01 PROCEDURE — 85027 COMPLETE CBC AUTOMATED: CPT

## 2018-01-01 PROCEDURE — 6360000002 HC RX W HCPCS: Performed by: NURSE ANESTHETIST, CERTIFIED REGISTERED

## 2018-01-01 PROCEDURE — 82570 ASSAY OF URINE CREATININE: CPT

## 2018-01-01 PROCEDURE — 72110 X-RAY EXAM L-2 SPINE 4/>VWS: CPT

## 2018-01-01 PROCEDURE — 83605 ASSAY OF LACTIC ACID: CPT

## 2018-01-01 PROCEDURE — 6360000002 HC RX W HCPCS

## 2018-01-01 PROCEDURE — G8417 CALC BMI ABV UP PARAM F/U: HCPCS | Performed by: RADIOLOGY

## 2018-01-01 PROCEDURE — 4004F PT TOBACCO SCREEN RCVD TLK: CPT | Performed by: PHYSICIAN ASSISTANT

## 2018-01-01 PROCEDURE — 99284 EMERGENCY DEPT VISIT MOD MDM: CPT

## 2018-01-01 PROCEDURE — 2580000003 HC RX 258: Performed by: INTERNAL MEDICINE

## 2018-01-01 PROCEDURE — P9046 ALBUMIN (HUMAN), 25%, 20 ML: HCPCS | Performed by: INTERNAL MEDICINE

## 2018-01-01 PROCEDURE — 3700000001 HC ADD 15 MINUTES (ANESTHESIA): Performed by: SPECIALIST

## 2018-01-01 PROCEDURE — 2580000003 HC RX 258: Performed by: PHYSICIAN ASSISTANT

## 2018-01-01 PROCEDURE — 85025 COMPLETE CBC W/AUTO DIFF WBC: CPT

## 2018-01-01 PROCEDURE — 3014F SCREEN MAMMO DOC REV: CPT | Performed by: PHYSICIAN ASSISTANT

## 2018-01-01 PROCEDURE — 36415 COLL VENOUS BLD VENIPUNCTURE: CPT

## 2018-01-01 PROCEDURE — 83880 ASSAY OF NATRIURETIC PEPTIDE: CPT

## 2018-01-01 PROCEDURE — 6360000002 HC RX W HCPCS: Performed by: EMERGENCY MEDICINE

## 2018-01-01 PROCEDURE — G8482 FLU IMMUNIZE ORDER/ADMIN: HCPCS | Performed by: RADIOLOGY

## 2018-01-01 PROCEDURE — 6360000002 HC RX W HCPCS: Performed by: INTERNAL MEDICINE

## 2018-01-01 PROCEDURE — 07B63ZX EXCISION OF LEFT AXILLARY LYMPHATIC, PERCUTANEOUS APPROACH, DIAGNOSTIC: ICD-10-PCS | Performed by: RADIOLOGY

## 2018-01-01 PROCEDURE — 99213 OFFICE O/P EST LOW 20 MIN: CPT | Performed by: PHYSICIAN ASSISTANT

## 2018-01-01 PROCEDURE — 3609017100 HC EGD: Performed by: SPECIALIST

## 2018-01-01 PROCEDURE — 85610 PROTHROMBIN TIME: CPT

## 2018-01-01 PROCEDURE — 1210000000 HC MED SURG R&B

## 2018-01-01 PROCEDURE — 87040 BLOOD CULTURE FOR BACTERIA: CPT

## 2018-01-01 PROCEDURE — 3609027000 HC COLONOSCOPY: Performed by: SPECIALIST

## 2018-01-01 PROCEDURE — 82550 ASSAY OF CK (CPK): CPT

## 2018-01-01 PROCEDURE — 88342 IMHCHEM/IMCYTCHM 1ST ANTB: CPT

## 2018-01-01 PROCEDURE — 99285 EMERGENCY DEPT VISIT HI MDM: CPT

## 2018-01-01 PROCEDURE — 7100000010 HC PHASE II RECOVERY - FIRST 15 MIN: Performed by: SPECIALIST

## 2018-01-01 PROCEDURE — 6360000002 HC RX W HCPCS: Performed by: NURSE PRACTITIONER

## 2018-01-01 PROCEDURE — G8482 FLU IMMUNIZE ORDER/ADMIN: HCPCS | Performed by: PHYSICIAN ASSISTANT

## 2018-01-01 PROCEDURE — 87086 URINE CULTURE/COLONY COUNT: CPT

## 2018-01-01 PROCEDURE — 86300 IMMUNOASSAY TUMOR CA 15-3: CPT

## 2018-01-01 PROCEDURE — 88313 SPECIAL STAINS GROUP 2: CPT

## 2018-01-01 PROCEDURE — 96372 THER/PROPH/DIAG INJ SC/IM: CPT | Performed by: PHYSICIAN ASSISTANT

## 2018-01-01 PROCEDURE — 85730 THROMBOPLASTIN TIME PARTIAL: CPT

## 2018-01-01 PROCEDURE — 6360000002 HC RX W HCPCS: Performed by: PHYSICIAN ASSISTANT

## 2018-01-01 PROCEDURE — 80053 COMPREHEN METABOLIC PANEL: CPT

## 2018-01-01 PROCEDURE — 2580000003 HC RX 258: Performed by: STUDENT IN AN ORGANIZED HEALTH CARE EDUCATION/TRAINING PROGRAM

## 2018-01-01 PROCEDURE — 83690 ASSAY OF LIPASE: CPT

## 2018-01-01 PROCEDURE — 6370000000 HC RX 637 (ALT 250 FOR IP): Performed by: ANESTHESIOLOGY

## 2018-01-01 PROCEDURE — 38505 NEEDLE BIOPSY LYMPH NODES: CPT | Performed by: RADIOLOGY

## 2018-01-01 PROCEDURE — 77067 SCR MAMMO BI INCL CAD: CPT

## 2018-01-01 PROCEDURE — 6370000000 HC RX 637 (ALT 250 FOR IP): Performed by: PHYSICIAN ASSISTANT

## 2018-01-01 PROCEDURE — 3017F COLORECTAL CA SCREEN DOC REV: CPT | Performed by: PHYSICIAN ASSISTANT

## 2018-01-01 PROCEDURE — 74177 CT ABD & PELVIS W/CONTRAST: CPT

## 2018-01-01 PROCEDURE — 80048 BASIC METABOLIC PNL TOTAL CA: CPT

## 2018-01-01 PROCEDURE — 84484 ASSAY OF TROPONIN QUANT: CPT

## 2018-01-01 PROCEDURE — 83930 ASSAY OF BLOOD OSMOLALITY: CPT

## 2018-01-01 PROCEDURE — 2500000003 HC RX 250 WO HCPCS: Performed by: NURSE ANESTHETIST, CERTIFIED REGISTERED

## 2018-01-01 PROCEDURE — 99253 IP/OBS CNSLTJ NEW/EST LOW 45: CPT | Performed by: INTERNAL MEDICINE

## 2018-01-01 PROCEDURE — 76942 ECHO GUIDE FOR BIOPSY: CPT | Performed by: RADIOLOGY

## 2018-01-01 PROCEDURE — 74150 CT ABDOMEN W/O CONTRAST: CPT | Performed by: RADIOLOGY

## 2018-01-01 PROCEDURE — C9113 INJ PANTOPRAZOLE SODIUM, VIA: HCPCS | Performed by: EMERGENCY MEDICINE

## 2018-01-01 PROCEDURE — 96360 HYDRATION IV INFUSION INIT: CPT

## 2018-01-01 PROCEDURE — 99204 OFFICE O/P NEW MOD 45 MIN: CPT | Performed by: RADIOLOGY

## 2018-01-01 PROCEDURE — 4004F PT TOBACCO SCREEN RCVD TLK: CPT | Performed by: RADIOLOGY

## 2018-01-01 PROCEDURE — G0297 LDCT FOR LUNG CA SCREEN: HCPCS

## 2018-01-01 PROCEDURE — 96375 TX/PRO/DX INJ NEW DRUG ADDON: CPT

## 2018-01-01 PROCEDURE — 6360000004 HC RX CONTRAST MEDICATION: Performed by: INTERNAL MEDICINE

## 2018-01-01 PROCEDURE — 3014F SCREEN MAMMO DOC REV: CPT | Performed by: RADIOLOGY

## 2018-01-01 PROCEDURE — 38505 NEEDLE BIOPSY LYMPH NODES: CPT

## 2018-01-01 PROCEDURE — 2500000003 HC RX 250 WO HCPCS: Performed by: NURSE PRACTITIONER

## 2018-01-01 PROCEDURE — 99214 OFFICE O/P EST MOD 30 MIN: CPT | Performed by: PHYSICIAN ASSISTANT

## 2018-01-01 PROCEDURE — 74022 RADEX COMPL AQT ABD SERIES: CPT

## 2018-01-01 PROCEDURE — 81001 URINALYSIS AUTO W/SCOPE: CPT

## 2018-01-01 PROCEDURE — 74150 CT ABDOMEN W/O CONTRAST: CPT

## 2018-01-01 PROCEDURE — 71046 X-RAY EXAM CHEST 2 VIEWS: CPT

## 2018-01-01 PROCEDURE — 2580000003 HC RX 258: Performed by: SPECIALIST

## 2018-01-01 PROCEDURE — 3700000000 HC ANESTHESIA ATTENDED CARE: Performed by: SPECIALIST

## 2018-01-01 PROCEDURE — 96365 THER/PROPH/DIAG IV INF INIT: CPT

## 2018-01-01 PROCEDURE — 88112 CYTOPATH CELL ENHANCE TECH: CPT

## 2018-01-01 PROCEDURE — G8427 DOCREV CUR MEDS BY ELIG CLIN: HCPCS | Performed by: PHYSICIAN ASSISTANT

## 2018-01-01 PROCEDURE — 70470 CT HEAD/BRAIN W/O & W/DYE: CPT

## 2018-01-01 PROCEDURE — 84300 ASSAY OF URINE SODIUM: CPT

## 2018-01-01 PROCEDURE — 3017F COLORECTAL CA SCREEN DOC REV: CPT | Performed by: RADIOLOGY

## 2018-01-01 PROCEDURE — 88305 TISSUE EXAM BY PATHOLOGIST: CPT

## 2018-01-01 PROCEDURE — 83935 ASSAY OF URINE OSMOLALITY: CPT

## 2018-01-01 PROCEDURE — G8427 DOCREV CUR MEDS BY ELIG CLIN: HCPCS | Performed by: RADIOLOGY

## 2018-01-01 PROCEDURE — 71260 CT THORAX DX C+: CPT

## 2018-01-01 PROCEDURE — 6360000004 HC RX CONTRAST MEDICATION: Performed by: EMERGENCY MEDICINE

## 2018-01-01 PROCEDURE — 88341 IMHCHEM/IMCYTCHM EA ADD ANTB: CPT

## 2018-01-01 PROCEDURE — 80076 HEPATIC FUNCTION PANEL: CPT

## 2018-01-01 PROCEDURE — 71045 X-RAY EXAM CHEST 1 VIEW: CPT

## 2018-01-01 PROCEDURE — 2580000003 HC RX 258: Performed by: EMERGENCY MEDICINE

## 2018-01-01 PROCEDURE — G8417 CALC BMI ABV UP PARAM F/U: HCPCS | Performed by: PHYSICIAN ASSISTANT

## 2018-01-01 RX ORDER — MORPHINE SULFATE 10 MG/.5ML
20 SOLUTION ORAL EVERY 4 HOURS PRN
Qty: 1 BOTTLE | Refills: 0
Start: 2018-01-01 | End: 2018-01-01

## 2018-01-01 RX ORDER — ETODOLAC 400 MG/1
400 TABLET, FILM COATED ORAL 2 TIMES DAILY
Qty: 60 TABLET | Refills: 3 | Status: SHIPPED | OUTPATIENT
Start: 2018-01-01 | End: 2018-01-01 | Stop reason: ALTCHOICE

## 2018-01-01 RX ORDER — LIDOCAINE HYDROCHLORIDE 10 MG/ML
1 INJECTION, SOLUTION EPIDURAL; INFILTRATION; INTRACAUDAL; PERINEURAL
Status: DISCONTINUED | OUTPATIENT
Start: 2018-01-01 | End: 2018-01-01 | Stop reason: HOSPADM

## 2018-01-01 RX ORDER — ONDANSETRON 2 MG/ML
4 INJECTION INTRAMUSCULAR; INTRAVENOUS ONCE
Status: COMPLETED | OUTPATIENT
Start: 2018-01-01 | End: 2018-01-01

## 2018-01-01 RX ORDER — 0.9 % SODIUM CHLORIDE 0.9 %
1000 INTRAVENOUS SOLUTION INTRAVENOUS ONCE
Status: COMPLETED | OUTPATIENT
Start: 2018-01-01 | End: 2018-01-01

## 2018-01-01 RX ORDER — SODIUM CHLORIDE 0.9 % (FLUSH) 0.9 %
10 SYRINGE (ML) INJECTION
Status: DISPENSED | OUTPATIENT
Start: 2018-01-01 | End: 2018-01-01

## 2018-01-01 RX ORDER — SODIUM CHLORIDE 9 MG/ML
INJECTION, SOLUTION INTRAVENOUS CONTINUOUS
Status: DISCONTINUED | OUTPATIENT
Start: 2018-01-01 | End: 2018-01-01

## 2018-01-01 RX ORDER — SODIUM CHLORIDE 0.9 % (FLUSH) 0.9 %
10 SYRINGE (ML) INJECTION PRN
Status: DISCONTINUED | OUTPATIENT
Start: 2018-01-01 | End: 2018-01-01 | Stop reason: HOSPADM

## 2018-01-01 RX ORDER — PROPOFOL 10 MG/ML
INJECTION, EMULSION INTRAVENOUS PRN
Status: DISCONTINUED | OUTPATIENT
Start: 2018-01-01 | End: 2018-01-01 | Stop reason: SDUPTHER

## 2018-01-01 RX ORDER — NALOXONE HYDROCHLORIDE 0.4 MG/ML
INJECTION, SOLUTION INTRAMUSCULAR; INTRAVENOUS; SUBCUTANEOUS
Status: COMPLETED
Start: 2018-01-01 | End: 2018-01-01

## 2018-01-01 RX ORDER — LACTULOSE 10 G/15ML
30 SOLUTION ORAL 3 TIMES DAILY
Qty: 1 BOTTLE | Refills: 1 | Status: SHIPPED | OUTPATIENT
Start: 2018-01-01

## 2018-01-01 RX ORDER — MORPHINE SULFATE 4 MG/ML
4 INJECTION, SOLUTION INTRAMUSCULAR; INTRAVENOUS EVERY 4 HOURS PRN
Status: DISCONTINUED | OUTPATIENT
Start: 2018-01-01 | End: 2018-01-01 | Stop reason: HOSPADM

## 2018-01-01 RX ORDER — TRAMADOL HYDROCHLORIDE 50 MG/1
50 TABLET ORAL EVERY 6 HOURS PRN
Qty: 12 TABLET | Refills: 0 | Status: SHIPPED | OUTPATIENT
Start: 2018-01-01 | End: 2018-01-01

## 2018-01-01 RX ORDER — SODIUM CHLORIDE 9 MG/ML
INJECTION, SOLUTION INTRAVENOUS CONTINUOUS
Status: DISCONTINUED | OUTPATIENT
Start: 2018-01-01 | End: 2018-01-01 | Stop reason: HOSPADM

## 2018-01-01 RX ORDER — NICOTINE 21 MG/24HR
1 PATCH, TRANSDERMAL 24 HOURS TRANSDERMAL DAILY
Status: DISCONTINUED | OUTPATIENT
Start: 2018-01-01 | End: 2018-01-01 | Stop reason: HOSPADM

## 2018-01-01 RX ORDER — BISACODYL 10 MG
10 SUPPOSITORY, RECTAL RECTAL DAILY PRN
Status: DISCONTINUED | OUTPATIENT
Start: 2018-01-01 | End: 2018-01-01 | Stop reason: HOSPADM

## 2018-01-01 RX ORDER — MORPHINE SULFATE 10 MG/.5ML
20 SOLUTION ORAL EVERY 4 HOURS PRN
Status: DISCONTINUED | OUTPATIENT
Start: 2018-01-01 | End: 2018-01-01

## 2018-01-01 RX ORDER — TRAMADOL HYDROCHLORIDE 50 MG/1
50 TABLET ORAL EVERY 6 HOURS PRN
Status: ON HOLD | COMMUNITY
End: 2018-01-01 | Stop reason: HOSPADM

## 2018-01-01 RX ORDER — 0.9 % SODIUM CHLORIDE 0.9 %
250 INTRAVENOUS SOLUTION INTRAVENOUS
Status: DISCONTINUED | OUTPATIENT
Start: 2018-01-01 | End: 2018-01-01 | Stop reason: HOSPADM

## 2018-01-01 RX ORDER — RANITIDINE 150 MG/1
150 TABLET ORAL 2 TIMES DAILY
Qty: 60 TABLET | Refills: 3 | Status: ON HOLD | OUTPATIENT
Start: 2018-01-01 | End: 2018-01-01 | Stop reason: HOSPADM

## 2018-01-01 RX ORDER — ONDANSETRON 2 MG/ML
4 INJECTION INTRAMUSCULAR; INTRAVENOUS EVERY 6 HOURS PRN
Status: DISCONTINUED | OUTPATIENT
Start: 2018-01-01 | End: 2018-01-01 | Stop reason: HOSPADM

## 2018-01-01 RX ORDER — PROMETHAZINE HYDROCHLORIDE 25 MG/ML
6.25 INJECTION, SOLUTION INTRAMUSCULAR; INTRAVENOUS ONCE
Status: COMPLETED | OUTPATIENT
Start: 2018-01-01 | End: 2018-01-01

## 2018-01-01 RX ORDER — NALOXONE HYDROCHLORIDE 0.4 MG/ML
0.4 INJECTION, SOLUTION INTRAMUSCULAR; INTRAVENOUS; SUBCUTANEOUS ONCE
Status: DISCONTINUED | OUTPATIENT
Start: 2018-01-01 | End: 2018-01-01 | Stop reason: HOSPADM

## 2018-01-01 RX ORDER — ALBUMIN (HUMAN) 12.5 G/50ML
25 SOLUTION INTRAVENOUS ONCE
Status: COMPLETED | OUTPATIENT
Start: 2018-01-01 | End: 2018-01-01

## 2018-01-01 RX ORDER — FAMOTIDINE 20 MG/1
40 TABLET, FILM COATED ORAL EVERY EVENING
Status: DISCONTINUED | OUTPATIENT
Start: 2018-01-01 | End: 2018-01-01 | Stop reason: HOSPADM

## 2018-01-01 RX ORDER — DOCUSATE SODIUM 100 MG/1
100 CAPSULE, LIQUID FILLED ORAL 2 TIMES DAILY
Status: DISCONTINUED | OUTPATIENT
Start: 2018-01-01 | End: 2018-01-01 | Stop reason: HOSPADM

## 2018-01-01 RX ORDER — FENTANYL CITRATE 50 UG/ML
50 INJECTION, SOLUTION INTRAMUSCULAR; INTRAVENOUS
Status: DISCONTINUED | OUTPATIENT
Start: 2018-01-01 | End: 2018-01-01 | Stop reason: HOSPADM

## 2018-01-01 RX ORDER — TRAMADOL HYDROCHLORIDE 50 MG/1
50 TABLET ORAL EVERY 6 HOURS PRN
Status: DISCONTINUED | OUTPATIENT
Start: 2018-01-01 | End: 2018-01-01

## 2018-01-01 RX ORDER — PANTOPRAZOLE SODIUM 40 MG/1
40 TABLET, DELAYED RELEASE ORAL DAILY
COMMUNITY

## 2018-01-01 RX ORDER — SODIUM CHLORIDE 0.9 % (FLUSH) 0.9 %
10 SYRINGE (ML) INJECTION EVERY 12 HOURS SCHEDULED
Status: DISCONTINUED | OUTPATIENT
Start: 2018-01-01 | End: 2018-01-01 | Stop reason: HOSPADM

## 2018-01-01 RX ORDER — MORPHINE SULFATE 4 MG/ML
4 INJECTION, SOLUTION INTRAMUSCULAR; INTRAVENOUS EVERY 4 HOURS PRN
Status: DISCONTINUED | OUTPATIENT
Start: 2018-01-01 | End: 2018-01-01

## 2018-01-01 RX ORDER — PROMETHAZINE HYDROCHLORIDE 25 MG/1
25 SUPPOSITORY RECTAL EVERY 6 HOURS PRN
Qty: 20 SUPPOSITORY | Refills: 1 | Status: SHIPPED | OUTPATIENT
Start: 2018-01-01 | End: 2018-01-01

## 2018-01-01 RX ORDER — PHYTONADIONE 10 MG/ML
10 INJECTION, EMULSION INTRAMUSCULAR; INTRAVENOUS; SUBCUTANEOUS ONCE
Status: DISCONTINUED | OUTPATIENT
Start: 2018-01-01 | End: 2018-01-01

## 2018-01-01 RX ORDER — ONDANSETRON 2 MG/ML
4 INJECTION INTRAMUSCULAR; INTRAVENOUS
Status: DISCONTINUED | OUTPATIENT
Start: 2018-01-01 | End: 2018-01-01 | Stop reason: HOSPADM

## 2018-01-01 RX ORDER — MIDAZOLAM HYDROCHLORIDE 1 MG/ML
0.5 INJECTION INTRAMUSCULAR; INTRAVENOUS
Status: DISCONTINUED | OUTPATIENT
Start: 2018-01-01 | End: 2018-01-01 | Stop reason: HOSPADM

## 2018-01-01 RX ORDER — MORPHINE SULFATE 10 MG/.5ML
20 SOLUTION ORAL EVERY 6 HOURS PRN
Status: DISCONTINUED | OUTPATIENT
Start: 2018-01-01 | End: 2018-01-01

## 2018-01-01 RX ORDER — LEVOFLOXACIN 5 MG/ML
750 INJECTION, SOLUTION INTRAVENOUS EVERY 24 HOURS
Status: DISCONTINUED | OUTPATIENT
Start: 2018-01-01 | End: 2018-01-01

## 2018-01-01 RX ORDER — NALOXONE HYDROCHLORIDE 0.4 MG/ML
0.2 INJECTION, SOLUTION INTRAMUSCULAR; INTRAVENOUS; SUBCUTANEOUS ONCE
Status: COMPLETED | OUTPATIENT
Start: 2018-01-01 | End: 2018-01-01

## 2018-01-01 RX ORDER — MORPHINE SULFATE 10 MG/.5ML
5 SOLUTION ORAL EVERY 4 HOURS PRN
Status: DISCONTINUED | OUTPATIENT
Start: 2018-01-01 | End: 2018-01-01 | Stop reason: HOSPADM

## 2018-01-01 RX ORDER — LIDOCAINE HYDROCHLORIDE 20 MG/ML
INJECTION, SOLUTION INFILTRATION; PERINEURAL PRN
Status: DISCONTINUED | OUTPATIENT
Start: 2018-01-01 | End: 2018-01-01 | Stop reason: SDUPTHER

## 2018-01-01 RX ORDER — GUAIFENESIN 600 MG/1
600 TABLET, EXTENDED RELEASE ORAL 2 TIMES DAILY
Status: DISCONTINUED | OUTPATIENT
Start: 2018-01-01 | End: 2018-01-01 | Stop reason: HOSPADM

## 2018-01-01 RX ORDER — BACITRACIN, NEOMYCIN, POLYMYXIN B 400; 3.5; 5 [USP'U]/G; MG/G; [USP'U]/G
OINTMENT TOPICAL ONCE
Status: DISCONTINUED | OUTPATIENT
Start: 2018-01-01 | End: 2018-01-01 | Stop reason: HOSPADM

## 2018-01-01 RX ORDER — GLYCOPYRROLATE 1 MG/5 ML
SYRINGE (ML) INTRAVENOUS PRN
Status: DISCONTINUED | OUTPATIENT
Start: 2018-01-01 | End: 2018-01-01 | Stop reason: SDUPTHER

## 2018-01-01 RX ORDER — MORPHINE SULFATE 2 MG/ML
2 INJECTION, SOLUTION INTRAMUSCULAR; INTRAVENOUS EVERY 4 HOURS PRN
Status: DISCONTINUED | OUTPATIENT
Start: 2018-01-01 | End: 2018-01-01

## 2018-01-01 RX ORDER — MORPHINE SULFATE 4 MG/ML
4 INJECTION, SOLUTION INTRAMUSCULAR; INTRAVENOUS EVERY 4 HOURS PRN
Qty: 1 ML | Refills: 0
Start: 2018-01-01 | End: 2018-01-01

## 2018-01-01 RX ORDER — ACETAMINOPHEN 325 MG/1
650 TABLET ORAL EVERY 4 HOURS PRN
Status: DISCONTINUED | OUTPATIENT
Start: 2018-01-01 | End: 2018-01-01

## 2018-01-01 RX ORDER — LIDOCAINE HYDROCHLORIDE 20 MG/ML
5 INJECTION, SOLUTION INFILTRATION; PERINEURAL
Status: DISCONTINUED | OUTPATIENT
Start: 2018-01-01 | End: 2018-01-01 | Stop reason: HOSPADM

## 2018-01-01 RX ORDER — ONDANSETRON 4 MG/1
4 TABLET, ORALLY DISINTEGRATING ORAL EVERY 8 HOURS PRN
Qty: 20 TABLET | Refills: 0 | Status: SHIPPED | OUTPATIENT
Start: 2018-01-01 | End: 2018-01-01 | Stop reason: ALTCHOICE

## 2018-01-01 RX ORDER — ONDANSETRON 4 MG/1
4 TABLET, ORALLY DISINTEGRATING ORAL EVERY 8 HOURS PRN
COMMUNITY
Start: 2018-01-01

## 2018-01-01 RX ORDER — KETOROLAC TROMETHAMINE 15 MG/ML
15 INJECTION, SOLUTION INTRAMUSCULAR; INTRAVENOUS EVERY 6 HOURS PRN
Status: DISCONTINUED | OUTPATIENT
Start: 2018-01-01 | End: 2018-01-01 | Stop reason: HOSPADM

## 2018-01-01 RX ORDER — LACTULOSE 10 G/15ML
30 SOLUTION ORAL 3 TIMES DAILY
Status: DISCONTINUED | OUTPATIENT
Start: 2018-01-01 | End: 2018-01-01 | Stop reason: HOSPADM

## 2018-01-01 RX ORDER — PSEUDOEPHEDRINE HCL 30 MG
100 TABLET ORAL 2 TIMES DAILY
Qty: 60 CAPSULE | Refills: 2 | Status: SHIPPED | OUTPATIENT
Start: 2018-01-01

## 2018-01-01 RX ORDER — GUAIFENESIN 600 MG/1
600 TABLET, EXTENDED RELEASE ORAL 2 TIMES DAILY
Qty: 30 TABLET | Refills: 2 | Status: SHIPPED | OUTPATIENT
Start: 2018-01-01

## 2018-01-01 RX ORDER — PANTOPRAZOLE SODIUM 40 MG/1
40 TABLET, DELAYED RELEASE ORAL DAILY
Status: DISCONTINUED | OUTPATIENT
Start: 2018-01-01 | End: 2018-01-01 | Stop reason: HOSPADM

## 2018-01-01 RX ORDER — GUAIFENESIN AND CODEINE PHOSPHATE 100; 10 MG/5ML; MG/5ML
10 SOLUTION ORAL 3 TIMES DAILY PRN
Qty: 90 ML | Refills: 0 | Status: SHIPPED | OUTPATIENT
Start: 2018-01-01 | End: 2018-01-01

## 2018-01-01 RX ORDER — NICOTINE 21 MG/24HR
1 PATCH, TRANSDERMAL 24 HOURS TRANSDERMAL DAILY
Qty: 30 PATCH | Refills: 3
Start: 2018-01-01

## 2018-01-01 RX ORDER — AZITHROMYCIN 250 MG/1
TABLET, FILM COATED ORAL
Qty: 1 PACKET | Refills: 0 | Status: SHIPPED | OUTPATIENT
Start: 2018-01-01 | End: 2018-01-01

## 2018-01-01 RX ADMIN — Medication 20 MG: at 09:36

## 2018-01-01 RX ADMIN — SODIUM CHLORIDE 250 ML: 9 INJECTION, SOLUTION INTRAVENOUS at 09:52

## 2018-01-01 RX ADMIN — SODIUM CHLORIDE 40 MG: 9 INJECTION, SOLUTION INTRAVENOUS at 16:04

## 2018-01-01 RX ADMIN — SODIUM CHLORIDE: 9 INJECTION, SOLUTION INTRAVENOUS at 02:27

## 2018-01-01 RX ADMIN — DOCUSATE SODIUM 100 MG: 100 CAPSULE, LIQUID FILLED ORAL at 09:15

## 2018-01-01 RX ADMIN — PROPOFOL 100 MG: 10 INJECTION, EMULSION INTRAVENOUS at 10:50

## 2018-01-01 RX ADMIN — Medication 10 ML: at 07:41

## 2018-01-01 RX ADMIN — PROPOFOL 100 MG: 10 INJECTION, EMULSION INTRAVENOUS at 11:10

## 2018-01-01 RX ADMIN — NALOXONE HYDROCHLORIDE 0.4 MG: 0.4 INJECTION, SOLUTION INTRAMUSCULAR; INTRAVENOUS; SUBCUTANEOUS at 18:59

## 2018-01-01 RX ADMIN — TRAMADOL HYDROCHLORIDE 50 MG: 50 TABLET, FILM COATED ORAL at 05:01

## 2018-01-01 RX ADMIN — ALBUMIN (HUMAN) 25 G: 0.25 INJECTION, SOLUTION INTRAVENOUS at 09:32

## 2018-01-01 RX ADMIN — NALOXONE HYDROCHLORIDE 0.2 MG: 0.4 INJECTION, SOLUTION INTRAMUSCULAR; INTRAVENOUS; SUBCUTANEOUS at 18:19

## 2018-01-01 RX ADMIN — Medication 10 ML: at 21:00

## 2018-01-01 RX ADMIN — ENOXAPARIN SODIUM 40 MG: 40 INJECTION SUBCUTANEOUS at 09:15

## 2018-01-01 RX ADMIN — Medication 10 ML: at 21:12

## 2018-01-01 RX ADMIN — FAMOTIDINE 40 MG: 20 TABLET, FILM COATED ORAL at 17:08

## 2018-01-01 RX ADMIN — DOCUSATE SODIUM 100 MG: 100 CAPSULE, LIQUID FILLED ORAL at 22:38

## 2018-01-01 RX ADMIN — ONDANSETRON 4 MG: 2 INJECTION INTRAMUSCULAR; INTRAVENOUS at 15:42

## 2018-01-01 RX ADMIN — TRAMADOL HYDROCHLORIDE 50 MG: 50 TABLET, FILM COATED ORAL at 11:23

## 2018-01-01 RX ADMIN — FAMOTIDINE 40 MG: 20 TABLET, FILM COATED ORAL at 17:44

## 2018-01-01 RX ADMIN — DOCUSATE SODIUM 100 MG: 100 CAPSULE, LIQUID FILLED ORAL at 21:05

## 2018-01-01 RX ADMIN — LIDOCAINE HYDROCHLORIDE 60 MG: 20 INJECTION, SOLUTION INFILTRATION; PERINEURAL at 10:50

## 2018-01-01 RX ADMIN — DOCUSATE SODIUM 100 MG: 100 CAPSULE, LIQUID FILLED ORAL at 07:41

## 2018-01-01 RX ADMIN — Medication 10 ML: at 22:39

## 2018-01-01 RX ADMIN — ONDANSETRON 4 MG: 2 INJECTION INTRAMUSCULAR; INTRAVENOUS at 03:19

## 2018-01-01 RX ADMIN — METHYLNALTREXONE BROMIDE 12 MG: 12 INJECTION, SOLUTION SUBCUTANEOUS at 18:28

## 2018-01-01 RX ADMIN — MORPHINE SULFATE 4 MG: 4 INJECTION, SOLUTION INTRAMUSCULAR; INTRAVENOUS at 18:27

## 2018-01-01 RX ADMIN — SODIUM CHLORIDE: 9 INJECTION, SOLUTION INTRAVENOUS at 17:08

## 2018-01-01 RX ADMIN — FENTANYL CITRATE 50 MCG: 50 INJECTION, SOLUTION INTRAMUSCULAR; INTRAVENOUS at 09:55

## 2018-01-01 RX ADMIN — BISACODYL 10 MG: 10 SUPPOSITORY RECTAL at 14:38

## 2018-01-01 RX ADMIN — CEFTRIAXONE 1 G: 1 INJECTION, POWDER, FOR SOLUTION INTRAMUSCULAR; INTRAVENOUS at 17:08

## 2018-01-01 RX ADMIN — SODIUM CHLORIDE 1000 ML: 9 INJECTION, SOLUTION INTRAVENOUS at 18:28

## 2018-01-01 RX ADMIN — TRAMADOL HYDROCHLORIDE 50 MG: 50 TABLET, FILM COATED ORAL at 23:19

## 2018-01-01 RX ADMIN — LACTULOSE 30 G: 20 SOLUTION ORAL at 15:06

## 2018-01-01 RX ADMIN — PROPOFOL 100 MG: 10 INJECTION, EMULSION INTRAVENOUS at 10:54

## 2018-01-01 RX ADMIN — PROPOFOL 100 MG: 10 INJECTION, EMULSION INTRAVENOUS at 10:56

## 2018-01-01 RX ADMIN — ONDANSETRON 4 MG: 2 INJECTION INTRAMUSCULAR; INTRAVENOUS at 09:35

## 2018-01-01 RX ADMIN — MIDAZOLAM HYDROCHLORIDE 0.5 MG: 1 INJECTION, SOLUTION INTRAMUSCULAR; INTRAVENOUS at 09:55

## 2018-01-01 RX ADMIN — SODIUM CHLORIDE: 9 INJECTION, SOLUTION INTRAVENOUS at 22:39

## 2018-01-01 RX ADMIN — MORPHINE SULFATE 2 MG: 2 INJECTION, SOLUTION INTRAMUSCULAR; INTRAVENOUS at 14:03

## 2018-01-01 RX ADMIN — FAMOTIDINE 40 MG: 20 TABLET, FILM COATED ORAL at 23:19

## 2018-01-01 RX ADMIN — Medication 0.2 MG: at 10:46

## 2018-01-01 RX ADMIN — IOPAMIDOL 100 ML: 755 INJECTION, SOLUTION INTRAVENOUS at 15:57

## 2018-01-01 RX ADMIN — PANTOPRAZOLE SODIUM 40 MG: 40 TABLET, DELAYED RELEASE ORAL at 09:15

## 2018-01-01 RX ADMIN — PANTOPRAZOLE SODIUM 40 MG: 40 TABLET, DELAYED RELEASE ORAL at 07:41

## 2018-01-01 RX ADMIN — SODIUM CHLORIDE 1000 ML: 9 INJECTION, SOLUTION INTRAVENOUS at 10:12

## 2018-01-01 RX ADMIN — TRAMADOL HYDROCHLORIDE 50 MG: 50 TABLET, FILM COATED ORAL at 14:45

## 2018-01-01 RX ADMIN — LEVOFLOXACIN 750 MG: 5 INJECTION, SOLUTION INTRAVENOUS at 22:39

## 2018-01-01 RX ADMIN — PROMETHAZINE HYDROCHLORIDE 6.25 MG: 25 INJECTION, SOLUTION INTRAMUSCULAR; INTRAVENOUS at 14:50

## 2018-01-01 RX ADMIN — CEFTRIAXONE 1 G: 1 INJECTION, POWDER, FOR SOLUTION INTRAMUSCULAR; INTRAVENOUS at 18:16

## 2018-01-01 RX ADMIN — Medication 5 MG: at 15:38

## 2018-01-01 RX ADMIN — TRAMADOL HYDROCHLORIDE 50 MG: 50 TABLET, FILM COATED ORAL at 20:45

## 2018-01-01 RX ADMIN — LIDOCAINE HYDROCHLORIDE 8 ML: 20 INJECTION, SOLUTION INFILTRATION; PERINEURAL at 10:02

## 2018-01-01 RX ADMIN — SODIUM CHLORIDE, PRESERVATIVE FREE 50 ML: 5 INJECTION INTRAVENOUS at 11:18

## 2018-01-01 RX ADMIN — PROPOFOL 100 MG: 10 INJECTION, EMULSION INTRAVENOUS at 11:15

## 2018-01-01 RX ADMIN — ALBUMIN (HUMAN) 25 G: 0.25 INJECTION, SOLUTION INTRAVENOUS at 21:37

## 2018-01-01 RX ADMIN — IOPAMIDOL 75 ML: 755 INJECTION, SOLUTION INTRAVENOUS at 13:34

## 2018-01-01 ASSESSMENT — ENCOUNTER SYMPTOMS
NAUSEA: 1
WHEEZING: 0
HEMOPTYSIS: 0
PHOTOPHOBIA: 0
ABDOMINAL PAIN: 1
COLOR CHANGE: 0
HEARTBURN: 0
DIARRHEA: 0
CONSTIPATION: 0
PHOTOPHOBIA: 0
EYE PAIN: 0
VOMITING: 0
RHINORRHEA: 0
VOMITING: 0
SORE THROAT: 0
ABDOMINAL PAIN: 1
EYE PAIN: 0
DIARRHEA: 0
NAUSEA: 0
ALLERGIC/IMMUNOLOGIC NEGATIVE: 1
VOMITING: 1
CONSTIPATION: 0
SORE THROAT: 0
ABDOMINAL PAIN: 1
APNEA: 0
EYE DISCHARGE: 0
WHEEZING: 0
WHEEZING: 0
COUGH: 0
SHORTNESS OF BREATH: 1
TROUBLE SWALLOWING: 0
VOMITING: 0
VOMITING: 0
SHORTNESS OF BREATH: 0
EYE PAIN: 0
COUGH: 0
BACK PAIN: 0
EYES NEGATIVE: 1
EYE REDNESS: 0
CHEST TIGHTNESS: 0
SPUTUM PRODUCTION: 0
SHORTNESS OF BREATH: 1
BACK PAIN: 0
NAUSEA: 0
SORE THROAT: 0
VOMITING: 0
CONSTIPATION: 1
VOICE CHANGE: 0
DIARRHEA: 0
NAUSEA: 1
BACK PAIN: 0
EYES NEGATIVE: 1
ABDOMINAL PAIN: 1
HEMOPTYSIS: 0
COUGH: 0
CONSTIPATION: 0
BACK PAIN: 0
DIARRHEA: 0
DOUBLE VISION: 0
NAUSEA: 0
CHEST TIGHTNESS: 0
RESPIRATORY NEGATIVE: 1
CHEST TIGHTNESS: 0
VOMITING: 0
BACK PAIN: 0
CONSTIPATION: 0
EYE PAIN: 0
HEARTBURN: 0
FACIAL SWELLING: 0
COUGH: 0
DIARRHEA: 0
SINUS PRESSURE: 0
DOUBLE VISION: 0
NAUSEA: 1
DIARRHEA: 0
ABDOMINAL PAIN: 0
ABDOMINAL PAIN: 0
BLURRED VISION: 0
BLURRED VISION: 0
BACK PAIN: 1
PHOTOPHOBIA: 0
ABDOMINAL DISTENTION: 0
TROUBLE SWALLOWING: 0
DOUBLE VISION: 0
ABDOMINAL PAIN: 1
DOUBLE VISION: 0
SPUTUM PRODUCTION: 0
SHORTNESS OF BREATH: 0
SHORTNESS OF BREATH: 0
STRIDOR: 0

## 2018-01-01 ASSESSMENT — PAIN DESCRIPTION - LOCATION
LOCATION: ABDOMEN

## 2018-01-01 ASSESSMENT — PAIN SCALES - GENERAL
PAINLEVEL_OUTOF10: 0
PAINLEVEL_OUTOF10: 0
PAINLEVEL_OUTOF10: 4
PAINLEVEL_OUTOF10: 6
PAINLEVEL_OUTOF10: 0
PAINLEVEL_OUTOF10: 5
PAINLEVEL_OUTOF10: 10
PAINLEVEL_OUTOF10: 7
PAINLEVEL_OUTOF10: 0
PAINLEVEL_OUTOF10: 9
PAINLEVEL_OUTOF10: 10
PAINLEVEL_OUTOF10: 2
PAINLEVEL_OUTOF10: 2
PAINLEVEL_OUTOF10: 6
PAINLEVEL_OUTOF10: 7
PAINLEVEL_OUTOF10: 5
PAINLEVEL_OUTOF10: 5
PAINLEVEL_OUTOF10: 0
PAINLEVEL_OUTOF10: 3
PAINLEVEL_OUTOF10: 8

## 2018-01-01 ASSESSMENT — PAIN DESCRIPTION - DESCRIPTORS
DESCRIPTORS: DISCOMFORT
DESCRIPTORS: CONSTANT
DESCRIPTORS: ACHING
DESCRIPTORS: BURNING;ACHING

## 2018-01-01 ASSESSMENT — PAIN - FUNCTIONAL ASSESSMENT
PAIN_FUNCTIONAL_ASSESSMENT: 0-10
PAIN_FUNCTIONAL_ASSESSMENT: 0-10

## 2018-01-01 ASSESSMENT — LIFESTYLE VARIABLES: SMOKING_STATUS: 1

## 2018-01-01 ASSESSMENT — PAIN DESCRIPTION - PAIN TYPE: TYPE: ACUTE PAIN

## 2018-01-01 ASSESSMENT — PAIN DESCRIPTION - FREQUENCY: FREQUENCY: CONTINUOUS

## 2018-01-23 NOTE — ED PROVIDER NOTES
for dizziness, tremors, syncope, facial asymmetry, speech difficulty, weakness, numbness and headaches. Hematological: Negative. Psychiatric/Behavioral: Negative. All other systems reviewed and are negative. Except as noted above the remainder of the review of systems was reviewed and negative. PAST MEDICAL HISTORY     Past Medical History:   Diagnosis Date    Obesity          SURGICAL HISTORY       Past Surgical History:   Procedure Laterality Date    CARPAL TUNNEL RELEASE Left 04/18/16    Osiel Chapman MD    CARPAL TUNNEL RELEASE Right 5/9/16    NORMA    CHOLECYSTECTOMY  2008    NASAL POLYP SURGERY  2011    EDENILSON AND BSO  2008    TONSILLECTOMY AND ADENOIDECTOMY      TUMOR REMOVAL Left 1979    benign ovarian mass         CURRENT MEDICATIONS       Previous Medications    ETODOLAC (LODINE) 400 MG TABLET    Take 1 tablet by mouth 2 times daily    HYDROCORTISONE 2.5 % CREAM    Apply topically 2 times daily. NYSTATIN (MYCOSTATIN) 867287 UNIT/GM CREAM    Apply topically 2 times daily. ALLERGIES     Review of patient's allergies indicates no known allergies.     FAMILY HISTORY       Family History   Problem Relation Age of Onset    Diabetes Mother     Arthritis Mother     Kidney Disease Father      black lung-    Diabetes Brother     Diabetes Brother           SOCIAL HISTORY       Social History     Social History    Marital status:      Spouse name: N/A    Number of children: N/A    Years of education: N/A     Social History Main Topics    Smoking status: Current Every Day Smoker     Packs/day: 1.00     Years: 38.00     Types: Cigarettes    Smokeless tobacco: Never Used      Comment: 1/2/18 started age 23    Alcohol use No    Drug use: No    Sexual activity: Not Currently     Other Topics Concern    None     Social History Narrative    None       SCREENINGS           PHYSICAL EXAM    (up to 7 for level 4, 8 or more for level 5)     ED Triage Vitals   BP Temp (1.549 m)         REASSESSMENT     ED Course        Patient presents emergency Department with complaints of rhinorrhea, nasal congestion, chest congestion and coughing. Patient does states his symptoms been ongoing for the past few weeks but worse over the past few days. Patient has a negative chest x-ray and laboratory testing unremarkable other than mildly elevated liver enzymes. We'll treat the patient for an acute bronchitis today however I will also provide her with follow-up information for GI given his elevated liver function tests and encourage her to follow up. It is noted that the patient has a normal bilirubin today and has no epigastric or upper abdominal tenderness to suggest pancreatitis. Patient will be provided with antiemetics given her nausea and vomiting as well. [facility]    PROCEDURES:    Procedures      FINAL IMPRESSION      1. Acute bronchitis, unspecified organism    2. Non-intractable vomiting with nausea, unspecified vomiting type          DISPOSITION/PLAN   DISPOSITION Decision To Discharge 01/23/2018 04:29:06 PM      PATIENT REFERRED TO:  Sarah Garcia PA-C  [address] [place] [address]  [phone]    Call in 2 days      Clif Kennedy MD  [address] [place] [address]  [address]  [address]  [phone]    Call in 2 days        DISCHARGE MEDICATIONS:  New Prescriptions    AZITHROMYCIN (ZITHROMAX) 250 MG TABLET    Take 2 tablets (500 mg) on Day 1, followed by 1 tablet (250 mg) once daily on Days 2 through 5. GUAIFENESIN-CODEINE (TUSSI-ORGANIDIN NR) 100-10 MG/5ML SYRUP    Take 10 mLs by mouth 3 times daily as needed for Cough for up to 3 days.     ONDANSETRON (ZOFRAN ODT) 4 MG DISINTEGRATING TABLET    Take 1 tablet by mouth every 8 hours as needed for Nausea       (Please note that portions of this note were completed with a voice recognition program.  Efforts were made to edit the dictations but occasionally words are mis-transcribed.)    Roma Velázquez, CAITLIN Velázquez, CAITLIN  01/23/18 1634

## 2018-02-20 NOTE — ED NOTES
2 attempts for IV access at this time, unsuccessful. Asking Juli Jolley RN at this time to attempt IV access. Patient jumped on first two attempts and blew two IV lines. Second RN aware.       Gildardo Guevara RN  02/20/18 9434

## 2018-02-20 NOTE — ED TRIAGE NOTES
Pt c/o abdominal pain with nausea and dry heaves. Pt has been having pain since thanksgiving diagnosed with cirrhosis and 2 bleeding ulcers diagnosed by egd  1 month ago. Pt states that pain has been increasing for 3 weeks  And although she has appointment with her dr  In 2 days. She is unable to  Handle the pain.  Pt does take  protonix but it has not helped with pain

## 2018-02-28 NOTE — PROGRESS NOTES
AND BSO  2008    TONSILLECTOMY AND ADENOIDECTOMY      TUMOR REMOVAL Left 1979    benign ovarian mass     Social History     Social History    Marital status:      Spouse name: N/A    Number of children: N/A    Years of education: N/A     Occupational History    Not on file. Social History Main Topics    Smoking status: Current Every Day Smoker     Packs/day: 1.00     Years: 38.00     Types: Cigarettes    Smokeless tobacco: Never Used      Comment: 1/2/18 started age 23    Alcohol use No    Drug use: No    Sexual activity: Not Currently     Other Topics Concern    Not on file     Social History Narrative    No narrative on file     Family History   Problem Relation Age of Onset    Diabetes Mother    Tamea Mao Arthritis Mother     Kidney Disease Father      black lung-    Diabetes Brother     Diabetes Brother      No Known Allergies  Current Outpatient Prescriptions   Medication Sig Dispense Refill    ondansetron (ZOFRAN-ODT) 4 MG disintegrating tablet       traMADol (ULTRAM) 50 MG tablet Take 50 mg by mouth every 6 hours as needed for Pain.  ranitidine (ZANTAC) 150 MG tablet Take 1 tablet by mouth 2 times daily 60 tablet 3    promethazine (PROMETHEGAN) 25 MG suppository Place 1 suppository rectally every 6 hours as needed for Nausea 20 suppository 1    pantoprazole (PROTONIX) 40 MG tablet Take 40 mg by mouth daily       No current facility-administered medications for this visit. Objective    Vitals:    02/28/18 1404   BP: 128/70   Site: Right Arm   Position: Sitting   Cuff Size: Small Adult   Pulse: 92   Resp: 16   Temp: 97.8 °F (36.6 °C)   TempSrc: Oral   SpO2: 95%   Weight: 210 lb (95.3 kg)   Height: 5' 1.5\" (1.562 m)     Physical Exam   Constitutional: She is oriented to person, place, and time. She appears distressed. obese   HENT:   Head: Normocephalic and atraumatic. Eyes: EOM are normal. Pupils are equal, round, and reactive to light.    icteric   Neck: Normal

## 2018-03-01 PROBLEM — R59.1 LYMPHADENOPATHY: Status: ACTIVE | Noted: 2018-01-01

## 2018-03-01 PROBLEM — R16.0 LIVER MASSES: Status: ACTIVE | Noted: 2018-01-01

## 2018-03-09 NOTE — H&P
Juma Queen, a female of 62 y.o. came to the office 3/9/2018. No chief complaint on file. HPI: Patient is here today for multiple liver masses and a large left axilla lymph node. She is here today for a biopsy of the lymph node and possible biopsy of liver masses if chilo tissue is not extensive enough. She has no new complaints. Still has abdominal pain, unchanged.     Family History   Problem Relation Age of Onset    Diabetes Mother     Arthritis Mother     Kidney Disease Father      black lung-    Diabetes Brother     Diabetes Brother        Past Surgical History:   Procedure Laterality Date    CARPAL TUNNEL RELEASE Left 04/18/16    Manjit Gonzalez MD    CARPAL TUNNEL RELEASE Right 5/9/16    NORMA    CHOLECYSTECTOMY  2008    NASAL POLYP SURGERY  2011    MD COLONOSCOPY FLX DX W/COLLJ SPEC WHEN PFRMD N/A 2/8/2018    COLONOSCOPY performed by Barrie Travis MD at . [facility] ESOPHAGOGASTRODUODENOSCOPY TRANSORAL DIAGNOSTIC N/A 2/8/2018    EGD ESOPHAGOGASTRODUODENOSCOPY performed by Barrie Travis MD at [address] [facility]  2008    TONSILLECTOMY AND ADENOIDECTOMY      TUMOR REMOVAL Left 1979    benign ovarian mass        Past Medical History:   Diagnosis Date    Cirrhosis of liver ([address]     History of stomach ulcers     Obesity        Social History     Social History    Marital status:      Spouse name: N/A    Number of children: N/A    Years of education: N/A     Social History Main Topics    Smoking status: Current Every Day Smoker     Packs/day: 1.00     Years: 38.00     Types: Cigarettes    Smokeless tobacco: Never Used      Comment: 1/2/18 started age 23    Alcohol use No    Drug use: No    Sexual activity: Not Currently     Other Topics Concern    None     Social History Narrative    None       No Known Allergies    Current Outpatient Prescriptions on File Prior to Encounter   Medication Sig Dispense Refill    ondansetron (ZOFRAN-ODT) 4 MG disintegrating tablet       traMADol (ULTRAM) 50 MG tablet Take 50 mg by mouth every 6 hours as needed for Pain.  ranitidine (ZANTAC) 150 MG tablet Take 1 tablet by mouth 2 times daily 60 tablet 3    pantoprazole (PROTONIX) 40 MG tablet Take 40 mg by mouth daily       No current facility-administered medications on file prior to encounter. Review of Systems   Constitutional: Positive for diaphoresis and malaise/fatigue. Negative for chills, fever and weight loss. HENT: Negative. Negative for congestion, ear pain, hearing loss and tinnitus. Eyes: Negative. Negative for blurred vision, double vision and photophobia. Respiratory: Positive for shortness of breath. Negative for cough, hemoptysis, sputum production and wheezing. Cardiovascular: Negative. Negative for chest pain, palpitations, claudication and leg swelling. Gastrointestinal: Positive for abdominal pain. Negative for constipation, diarrhea, heartburn, nausea and vomiting. Genitourinary: Negative. Negative for dysuria, flank pain, frequency, hematuria and urgency. Musculoskeletal: Negative. Negative for back pain, joint pain and neck pain. Skin: Negative. Negative for itching and rash. Neurological: Positive for weakness. Negative for dizziness, tingling, tremors, sensory change and headaches. Endo/Heme/Allergies: Negative. Negative for environmental allergies. Does not bruise/bleed easily. Psychiatric/Behavioral: Negative. Negative for depression, hallucinations, substance abuse and suicidal ideas. The patient is not nervous/anxious. OBJECTIVE:  BP (!) 160/82   Pulse 96   Temp 97.5 °F (36.4 °C) (Oral)   Resp 23   Wt 210 lb (95.3 kg)   SpO2 99%   BMI 39.04 kg/m²     Physical Exam   Constitutional: She is oriented to person, place, and time. She appears well-developed and well-nourished. No distress. HENT:   Head: Normocephalic and atraumatic.    Right Ear: External ear normal.

## 2018-03-09 NOTE — PROGRESS NOTES
Pt doing well post biopsy. Specimens were taken to lab as ordered. Tolerated popsicle and some fluids. D/C instructions given to pt and daughter. Verb understanding. VSS pt denies SOB or pain at this time. Left axillary biopsy site without bleeding or swelling. Band aid intact. IV site removed. 1120 assisted pt with getting dressed. 1125 pt wheeled to lobby in w/c   d/c to home with daughter. Pt  Assisted into car / no complaints voiced.

## 2018-03-11 PROBLEM — E87.1 HYPONATREMIA: Status: ACTIVE | Noted: 2018-01-01

## 2018-03-11 NOTE — ED PROVIDER NOTES
[facility] ED  eMERGENCY dEPARTMENT eNCOUnter      Pt Name: Aleida Richardson  MRN: [id number]  [place] 1960  Date of evaluation: 3/11/2018  Provider: Wilman Pro, [address] [place] [address]       Chief Complaint   Patient presents with    Abdominal Pain     pt c/o abdominal pain, bloating, trouble taking a breath, and trouble eating         HISTORY OF PRESENT ILLNESS   (Location/Symptom, Timing/Onset, Context/Setting, Quality, Duration, Modifying Factors, Severity)  Note limiting factors. Aleida Richardson is a 62 y.o. female who presents to the emergency department with complaint of poor appetite, not eatting or drinking much, feeling bloated abdomen hurts with movement or deep breath or position changes. Patient with CT abdomen 2/20/18 and CT chest (with portion of abdomen) 3/8/18. CT scan of the chest for 3/8/2018 shows multiple liver masses. Patient denies any fever or chills. Patient's abdominal pain is the same as previous. Patient is jaundiced than has a history of cirrhosis which is the same as previous. HPI    Nursing Notes were reviewed. REVIEW OF SYSTEMS    (2-9 systems for level 4, 10 or more for level 5)     Review of Systems   Constitutional: Positive for appetite change and fatigue. Negative for activity change, chills, fever and unexpected weight change. HENT: Negative for drooling, ear pain, nosebleeds, sinus pressure and trouble swallowing. Respiratory: Negative for cough, chest tightness and shortness of breath. Cardiovascular: Negative for chest pain and leg swelling. Gastrointestinal: Positive for abdominal pain and constipation. Negative for diarrhea and vomiting. Endocrine: Negative for polydipsia and polyphagia. Genitourinary: Negative for dysuria, flank pain and frequency. Musculoskeletal: Negative for back pain and myalgias. Skin: Negative for pallor and rash. Neurological: Negative for syncope, weakness and headaches.    Hematological: Does not use No    Drug use: No    Sexual activity: Not Currently     Other Topics Concern    None     Social History Narrative    None       SCREENINGS             PHYSICAL EXAM    (up to 7 for level 4, 8 or more for level 5)     ED Triage Vitals [03/11/18 1618]   BP Temp Temp Source Pulse Resp SpO2 Height Weight   (!) 156/89 97.5 °F (36.4 °C) Oral 103 22 97 % 5' 1\" (1.549 m) 208 lb (94.3 kg)       Physical Exam   Constitutional: She appears well-developed and well-nourished. No distress. HENT:   Head: Normocephalic and atraumatic. Head is without Lucero's sign. Right Ear: External ear normal.   Left Ear: External ear normal.   Nose: Nose normal.   Mouth/Throat: Oropharynx is clear and moist. Mucous membranes are dry. No oropharyngeal exudate. Eyes: EOM are normal. Pupils are equal, round, and reactive to light. No foreign body present in the right eye. Left eye exhibits no exudate. Scleral icterus is present. Neck: Normal range of motion. Neck supple. No JVD present. No neck rigidity. No tracheal deviation present. No thyromegaly present. Cardiovascular: Regular rhythm, S1 normal, S2 normal, normal heart sounds and intact distal pulses. No extrasystoles are present. Tachycardia present. Exam reveals no gallop, no S3, no S4, no distant heart sounds, no friction rub and no decreased pulses. No murmur heard. Pulses:       Radial pulses are 2+ on the right side, and 2+ on the left side. Pulmonary/Chest: Effort normal and breath sounds normal. No stridor. No respiratory distress. She has no wheezes. She has no rales. She exhibits no tenderness. Abdominal: Soft. Normal aorta and bowel sounds are normal. She exhibits distension. She exhibits no shifting dullness, no pulsatile liver, no abdominal bruit, no ascites, no pulsatile midline mass and no mass. There is hepatomegaly. There is no hepatosplenomegaly. There is no tenderness.  There is no rigidity, no rebound, no guarding, no CVA tenderness, no back by Daily Muralis, 03/11/2018  18:51, by ELYSSA   CBC WITH AUTO DIFFERENTIAL - Abnormal; Notable for the following:     .9 (*)     MCH 34.4 (*)     RDW 19.6 (*)     Platelets 890 (*)     Neutrophils # 8.7 (*)     Lymphocytes # 0.9 (*)     Monocytes # 1.0 (*)     All other components within normal limits   COMPREHENSIVE METABOLIC PANEL - Abnormal; Notable for the following:     Sodium 125 (*)     Chloride 86 (*)     CO2 18 (*)     Anion Gap 21 (*)     CREATININE 0.46 (*)     Alb 2.8 (*)     Total Bilirubin 26.1 (*)     Alkaline Phosphatase 334 (*)      (*)      (*)     Globulin 3.6 (*)     All other components within normal limits   LIPASE - Abnormal; Notable for the following:     Lipase 103 (*)     All other components within normal limits   CK   URINE RT REFLEX TO CULTURE   POCT CREATININE       All other labs were within normal range or not returned as of this dictation. EMERGENCY DEPARTMENT COURSE and DIFFERENTIAL DIAGNOSIS/MDM:   Vitals:    Vitals:    03/11/18 1618   BP: (!) 156/89   Pulse: 103   Resp: 22   Temp: 97.5 °F (36.4 °C)   TempSrc: Oral   SpO2: 97%   Weight: 208 lb (94.3 kg)   Height: 5' 1\" (1.549 m)           MDM  She was ordered Relistor. [facility] report shows patient has 120 ultram on active RX. Patient with poor oral intake and dehydration. Lactic acid elevated secondary to dehydration. Tachycardia due to dehydration and poor fluid intake. Dr. Tristen Anderson accepted the patient. CRITICAL CARE TIME   Total Critical Care time was 33 minutes, excluding separately reportable procedures. There was a high probability of clinically significant/life threatening deterioration in the patient's condition which required my urgent intervention. CONSULTS:  None    PROCEDURES:  Unless otherwise noted below, none     Procedures    FINAL IMPRESSION      1. Hyponatremia    2. Hyperbilirubinemia    3. Dehydration    4. Fecal impaction of colon ([address]    5. Chronic abdominal pain    6.

## 2018-03-12 NOTE — PROGRESS NOTES
3 seconds   Peripheral Pulses: +2 palpable, equal bilaterally     Labs:   Recent Labs      03/11/18   1730  03/12/18   0842   WBC  10.6  9.3   HGB  15.1  14.1   HCT  44.8  41.0   PLT  101*  99*     Recent Labs      03/11/18   1730  03/12/18   0842   NA  125*  126*   K  5.1  5.0   CL  86*  88*   CO2  18*  17*   BUN  13  13   CREATININE  0.46*  0.47*   CALCIUM  9.1  8.2*     Recent Labs      03/11/18   1730  03/12/18   0842   AST  516*  436*   ALT  121*  103*   BILIDIR   --   19.6*   BILITOT  26.1*  25.4*   ALKPHOS  334*  308*     Recent Labs      03/11/18   1730   INR  2.3     Recent Labs      03/11/18   1730   CKTOTAL  134       Urinalysis:    Lab Results   Component Value Date    NITRU POSITIVE 03/11/2018    WBCUA 10-20 03/11/2018    BACTERIA Moderate 03/11/2018    RBCUA 0-2 03/11/2018    BLOODU Negative 03/11/2018    SPECGRAV 1.027 03/11/2018    GLUCOSEU Negative 03/11/2018     Radiology:  XR Acute Abd Series Chest 1 VW    (Results Pending)     Assessment/Plan:    #Decompensated Hepatic cirrhosis with jaundice and possible hepatocellular carcinoma (workup in process as an outpatient) and with significant ascites       - Patients symptoms are likely due to her ascites; will order a therapeutic and diagnostic paracentesis to be done today; abdomen is soft with no guarding or rigidity so SBP lower on the differential      - Awaiting INR to calculate MELD score but based on current data it is likely at least 27; she will likely need a liver transplant; GI was consulted for their opinion         #Constipation       - I suspect its due to her abdominal swelling being so significant; was given aggressive bowel regimen yesterday    #? UTI       - Will err on the side of caution and continue treatment given her lactic acidosis and continue IV Abx for now but will switch to CTX which has better coverage and will also cover for possible SBP     #Lactic acidosis       - Patient is likely intravascularly depleted; improved with fluids; will continue for now       - Will likely need albumin with her paracentesis today      Active Hospital Problems    Diagnosis Date Noted    Hyponatremia [E87.1] 03/11/2018     Additional work up or/and treatment plan may be added today or then after based on clinical progression. I am managing a portion of pt care. Some medical issues are handled by other specialists. Additional work up and treatment should be done in out pt setting by pt PCP and other out pt providers. In addition to examining and evaluating pt, I spent additional time explaining care, normal and abnormal findings, and treatment plan. All of pt questions were answered. Counseling, diet and education were  provided. Case will be discussed with nursing staff when appropriate. Family will be updated if and when appropriate.       Diet: DIET CARDIAC;    Code Status: Full Code    PT/OT Eval     Electronically signed by Martin Lam MD on 3/12/2018 at 1:16 PM

## 2018-03-12 NOTE — PROGRESS NOTES
Pt arrived from ER via cart. Admission and assessment completed. Oriented to room call light tv and phone. Family in room with pt. Call light in reach. Pt instructed that urine sample needed for lab.

## 2018-03-12 NOTE — CONSULTS
Surgical History:   Procedure Laterality Date    CARPAL TUNNEL RELEASE Left 04/18/16    Paula Denis MD    CARPAL TUNNEL RELEASE Right 5/9/16    NORMA    CHOLECYSTECTOMY  2008    NASAL POLYP SURGERY  2011    FL COLONOSCOPY FLX DX W/COLLJ SPEC WHEN PFRMD N/A 2/8/2018    COLONOSCOPY performed by Scooby Kulkarni MD at [address] ESOPHAGOGASTRODUODENOSCOPY TRANSORAL DIAGNOSTIC N/A 2/8/2018    EGD ESOPHAGOGASTRODUODENOSCOPY performed by Scooby Kulkarni MD at [address]  2008    TONSILLECTOMY AND ADENOIDECTOMY      TUMOR REMOVAL Left 1979    benign ovarian mass       Family History  Family History   Problem Relation Age of Onset    Diabetes Mother     Arthritis Mother     Kidney Disease Father      black lung-    Diabetes Brother     Diabetes Brother      [] Unable to obtain due to ventilated and/ or neurologic status    Social History     Social History    Marital status:      Spouse name: N/A    Number of children: N/A    Years of education: N/A     Occupational History    Not on file.      Social History Main Topics    Smoking status: Current Every Day Smoker     Packs/day: 1.00     Years: 38.00     Types: Cigarettes    Smokeless tobacco: Never Used      Comment: 1/2/18 started age 23    Alcohol use No    Drug use: No    Sexual activity: Not Currently     Other Topics Concern    Not on file     Social History Narrative    No narrative on file      [] Unable to obtain due to ventilated and/ or neurologic status      Home Medications:      Prescriptions Prior to Admission: ondansetron (ZOFRAN-ODT) 4 MG disintegrating tablet, 4 mg every 8 hours as needed   traMADol (ULTRAM) 50 MG tablet, Take 50 mg by mouth every 6 hours as needed for Pain.  pantoprazole (PROTONIX) 40 MG tablet, Take 40 mg by mouth daily  ranitidine (ZANTAC) 150 MG tablet, Take 1 tablet by mouth 2 times daily    Current Hospital Medications:     Scheduled Meds:   cefTRIAXone (ROCEPHIN) IV  1 g Intravenous Q24H    sodium chloride flush  10 mL Intravenous 2 times per day    docusate sodium  100 mg Oral BID    enoxaparin  40 mg Subcutaneous Daily    nicotine  1 patch Transdermal Daily    pantoprazole  40 mg Oral Daily    famotidine  40 mg Oral QPM     Continuous Infusions:   sodium chloride 75 mL/hr at 03/11/18 2239     PRN Meds:.bisacodyl, sodium chloride flush, acetaminophen, magnesium hydroxide, ondansetron, traMADol  .  sodium chloride 75 mL/hr at 03/11/18 2239        Allergies:     No Known Allergies     Review of Systems:       [x] CV, Resp, Neuro, , and all other systems reviewed and negative other than listed in HPI. Objective Findings:     Vitals:   Vitals:    03/11/18 2119 03/11/18 2323 03/12/18 0412 03/12/18 1517   BP:  (!) 154/86 136/64 (!) 130/56   Pulse:  107 91 91   Resp: 18 18 18 20   Temp:  97.5 °F (36.4 °C) 97.9 °F (36.6 °C) 97.9 °F (36.6 °C)   TempSrc: Oral Oral Oral Oral   SpO2:  97% 98% 98%   Weight: 211 lb 6.7 oz (95.9 kg)      Height: 5' 1\" (1.549 m)           Physical Examination:  General: awake, alert, NAD  HEENT: Normocephalic, + scleral icterus. Neck: No JVD. Heart: Regular, no murmur, no rub/gallop. No RV heave. Lungs: Clear to ascultation, no rales/wheezing/rhonchi. Good chest wall excursion. Abdomen: Obese, Soft, tenderness in upper abd with palpation, Bowel sounds actove  Extremities: No clubbing/cyanosis, no edema. Skin: Jaundiced, warm, dry, normal turgor, no rash, no bruise, no petichiae. Neuro: No myoclonus or tremor.    Psych: Normal affect    Results/ Medications reviewed 3/12/2018, 3:40 PM     Laboratory, Microbiology, Pathology, Radiology, Cardiology, Medications and Transcriptions reviewed  Scheduled Meds:   cefTRIAXone (ROCEPHIN) IV  1 g Intravenous Q24H    sodium chloride flush  10 mL Intravenous 2 times per day    docusate sodium  100 mg Oral BID    enoxaparin  40 mg Subcutaneous Daily    nicotine  1 patch Transdermal malignancy  Plan:   -Monitor serial labs  -Serum ammonia is pending  -Discuss patient's candidacy for MRCP vs ERCP to evaluate/treat for biliary obstruction/obstructive jaundice  -Further recommendations to follow  Comments: Thank you for allowing us to participate in the care of this patient. Will continue to follow. Please call if questions or concerns arise.     Electronically signed by TERE Mejia on 3/12/2018 at 3:40 PM

## 2018-03-12 NOTE — CARE COORDINATION
PATIENT UP INDEP IN ROM FROM HOME WITH CHILDREN NO NEEDS IDENTIFIED  CONTINUE TO FOLLOW FOR D/C NEEDS

## 2018-03-12 NOTE — PROGRESS NOTES
At 3:00 soapsuds enema given. Pt had very small results. Resting in bed at present time. Call light in reach.

## 2018-03-12 NOTE — PROGRESS NOTES
Notified Dr. Devan Perez that Bruce Pan NP said they were planning to do the paracentesis tomorrow unless Dr. Devan Perez does it tonight.  Electronically signed by Payton Wells RN on 3/12/2018 at 4:42 PM

## 2018-03-12 NOTE — PROGRESS NOTES
Notified Dr. Adrienne Oshea of critical [address] Also notified him that [place] said if Dr. Greco is to to the paracentesis, he wants a mixing test ordered.  Electronically signed by Lincoln Gordon RN on 3/12/2018 at 5:34 PM

## 2018-03-12 NOTE — H&P
ADDENDUM:    A/P  UTI   Hyponatremia  Constipation/fecal impaction  Hepatic cirrhosis w/bx pending     Admit inpt w/tele  Consult heme/onc  NS IVF  Urine Na, creatinine, osm, s.  Osm  Enema, dulcolax suppository  levaquin qd  Cbc, cmp, Mg in am  Urine and blood cultures pending
Smoking status: Current Every Day Smoker     Packs/day: 1.00     Years: 38.00     Types: Cigarettes    Smokeless tobacco: Never Used      Comment: 1/2/18 started age 23    Alcohol use No    Drug use: No    Sexual activity: Not Currently     Other Topics Concern    Not on file     Social History Narrative    No narrative on file     MEDICATIONS:   Prior to Admission medications    Medication Sig Start Date End Date Taking? Authorizing Provider   ondansetron (ZOFRAN-ODT) 4 MG disintegrating tablet  2/27/18   Historical Provider, MD   traMADol (ULTRAM) 50 MG tablet Take 50 mg by mouth every 6 hours as needed for Pain. Historical Provider, MD   ranitidine (ZANTAC) 150 MG tablet Take 1 tablet by mouth 2 times daily 2/28/18   Sarah Garcia PA-C   pantoprazole (PROTONIX) 40 MG tablet Take 40 mg by mouth daily    Historical Provider, MD       ALLERGIES: Patient has no known allergies. REVIEW OF SYSTEM:   ROS as noted in HPI, 12 point ROS reviewed and otherwise negative.     OBJECTIVE  PHYSICAL EXAM: BP (!) 156/82   Pulse 95   Temp 97.5 °F (36.4 °C) (Oral)   Resp 18   Ht 5' 1\" (1.549 m)   Wt 208 lb (94.3 kg)   SpO2 99%   BMI 39.30 kg/m²     CONSTITUTIONAL:  alert and mild distress  EYES:  pupils equal, round and reactive to light, icteric bilaterally and conjunctiva normal  ENT:  normocepalic, without obvious abnormality, atraumatic  NECK:  supple, symmetrical, trachea midline and skin normal  LUNGS:  no increased work of breathing and clear to auscultation  CARDIOVASCULAR:  normal apical pulses and normal S1 and S2  ABDOMEN:  normal bowel sounds, distended and tenderness noted diffusely  MUSCULOSKELETAL:  there is no redness, warmth, or swelling of the joints  full range of motion noted  NEUROLOGIC:  Mental Status Exam:  Level of Alertness:   awake  Orientation:   person, place, time  SKIN:  Jaundice, healing sores over entire body    DATA:     Diagnostic tests reviewed for today's visit:    Most

## 2018-03-13 PROBLEM — C78.7 METASTATIC ADENOCARCINOMA TO LIVER (HCC): Status: ACTIVE | Noted: 2018-01-01

## 2018-03-13 PROBLEM — G89.3 ACUTE NEOPLASM-RELATED PAIN: Status: ACTIVE | Noted: 2018-01-01

## 2018-03-13 NOTE — PROGRESS NOTES
Morphine changed to 4mg every 4 hours per Dr. Patrcia Soulier. Dr. Patrcia Soulier asked Dr. Leilani Vergara to see pt and he is going to readjust pain meds.  Electronically signed by Ede Solis RN on 3/13/2018 at 6:09 PM

## 2018-03-13 NOTE — PROGRESS NOTES
Assessment documented. Dr. Vitor Casarez was in and updated pt and daughter on plan of care. Albumin ordered and given for elevated Lactic acid. Pt is alert and holding conversations. She has been up and ambulating to the restroom with assistance. Waiting to hear from Dr. Vince Michel regarding POC and tx options. Will continue to monitor. Alarm on, call light in reach.  Electronically signed by Julienne Lion RN on 3/13/2018 at 10:37 AM

## 2018-03-13 NOTE — CONSULTS
malignancy less likely. Probable remote L1 compression fracture. All CT scans at this facility use dose modulation, iterative reconstruction, and/or weight based dosing when appropriate to reduce radiation dose to as low as reasonably achievable. Xr Chest Portable    Result Date: 2/20/2018  EXAMINATION: XR CHEST PORTABLE. DATE AND TIME:2/20/2018 3:15 PM CLINICAL HISTORY: Shortness of breath   sob  COMPARISONS: None FINDINGS:The heart, mediastinum and pulmonary vasculature are within normal limits. Visualized lung fields are clear. Bones unremarkable. NO  ACTIVE LUNG DISEASE. Us Biopsy Lymph Node    Result Date: 3/10/2018  Left axillary sonography. HISTORY: Enlarged lymph node. FINDINGS: Imaging of the left axilla reveals several lymph nodes, the largest lymph node measuring 2.7 x 1.5 cm. No abnormal fluid collections are identified. Enlarged left axillary lymph node      ASSESSMENT AND PLAN  1. The pt has hepatosplenomegaly with multiple liver lesions suspicous for malignancy r/o mets from breast CA  , r/o cholangioCA with markedly elevated CEA and CA 19-9 levels. . Possibility of hepatocellular CA is less likely with normal AFP level. 2. Left breast mass and left axillary adenopathy  r/o breast CA. Path report from left axillary node Bx will be checked as well as CA 27-29 level  3. Jaundice probably liver failure from both cirrhosis and malignancy. Pt is a poor candidate for chemotx because of her liver dysfunction. She has poor prognosis. Thank you, Dr. Husam Loja for this consultation. Please call me if with questions.     Electronically signed by David Lopez MD on 3/12/2018 at 10:00 PM

## 2018-03-13 NOTE — CONSULTS
larger from study dating February 20, 2018. The pancreas and adrenals are unremarkable. The kidneys demonstrate no signs of focal    mass or hydronephrosis. The visualized bowel loops appear normal.   There is a small perihepatic fluid collection. There is no evidence of free air. Several lymph nodes are seen in the retroperitoneum, these are borderline enlarged by CT criteria.           Active Hospital Problems    Diagnosis Date Noted    Acute neoplasm-related pain [G89.3] 03/13/2018    Metastatic adenocarcinoma to liver ([facility] [address] [C78.7] 03/13/2018    Hyponatremia [E87.1] 03/11/2018    Liver mass [R16.0] 03/01/2018     Start Oral Roxanol 20 mg / ml q 4 hours     Zofran and other supportive care.     See dictation       Electronically signed by David Miller MD on 3/13/2018 at 7:11 PM

## 2018-03-13 NOTE — PROGRESS NOTES
Refill: Brisk,< 3 seconds   Peripheral Pulses: +2 palpable, equal bilaterally     Labs:   Recent Labs      03/11/18   1730  03/12/18   0842  03/13/18   0617   WBC  10.6  9.3  8.0   HGB  15.1  14.1  13.3   HCT  44.8  41.0  39.4   PLT  101*  99*  91*     Recent Labs      03/11/18   1730  03/12/18   0842  03/13/18   0617   NA  125*  126*  129*   K  5.1  5.0  4.8   CL  86*  88*  93*   CO2  18*  17*  16*   BUN  13  13  14   CREATININE  0.46*  0.47*  0.46*   CALCIUM  9.1  8.2*  8.4*     Recent Labs      03/11/18   1730  03/12/18   0842  03/13/18   0617   AST  516*  436*  414*   ALT  121*  103*  92*   BILIDIR   --   19.6*   --    BILITOT  26.1*  25.4*  26.1*   ALKPHOS  334*  308*  266*     Recent Labs      03/11/18   1730  03/12/18   1412   INR  2.3  2.7     Recent Labs      03/11/18   1730   CKTOTAL  134       Urinalysis:      Lab Results   Component Value Date    NITRU POSITIVE 03/11/2018    WBCUA 10-20 03/11/2018    BACTERIA Moderate 03/11/2018    RBCUA 0-2 03/11/2018    BLOODU Negative 03/11/2018    SPECGRAV 1.027 03/11/2018    GLUCOSEU Negative 03/11/2018     Radiology:  XR Acute Abd Series Chest 1 VW   Final Result   1. NO EVIDENCE OF ACTIVE DISEASE IN THE CHEST. 2.  NONOBSTRUCTIVE ABDOMINAL BOWEL GAS PATTERN AND NO PNEUMOPERITONEUM. Assessment/Plan:    #Decompensated Hepatic cirrhosis with jaundice and metastatic Adenocarcinoma       - After discussion at length with gastroenterology and IR there was not a significant amount of ascites on her CT scan and her discomfort is probably mass effect      - MELD score has a poor prognosis; her adenocarcinoma also has a poor prognosis      - May be hospice appropriate if there is nothing that can be done considering her liver dysfunction         #Constipation       - I suspect its due to her abdominal swelling being so significant; was given aggressive bowel regimen yesterday    #? UTI       - Will err on the side of caution and continue treatment given her lactic acidosis and continue IV Abx with CTX    #Lactic acidosis       - likely due to the bulky tumor; will stop trending    Active Hospital Problems    Diagnosis Date Noted    Hyponatremia [E87.1] 03/11/2018     Additional work up or/and treatment plan may be added today or then after based on clinical progression. I am managing a portion of pt care. Some medical issues are handled by other specialists. Additional work up and treatment should be done in out pt setting by pt PCP and other out pt providers. In addition to examining and evaluating pt, I spent additional time explaining care, normal and abnormal findings, and treatment plan. All of pt questions were answered. Counseling, diet and education were  provided. Case will be discussed with nursing staff when appropriate. Family will be updated if and when appropriate.       Diet: DIET CARDIAC;    Code Status: Full Code    PT/OT Eval     Electronically signed by Anna Lyle MD on 3/13/2018 at 11:52 AM

## 2018-03-14 NOTE — PROGRESS NOTES
Family requested anothre discussion. Patient is stating she wants to go home; difficult to determine orientation at this time. Patients 4 children are bedside; after lengthy decision it was agreed upon that they would pursue home with hospice tonight.   WIll change code status back to [address]

## 2018-03-14 NOTE — PROGRESS NOTES
Hospitalist Progress Note      PCP: Fabienne Chu PA-C    Date of Admission: 3/11/2018    Chief Complaint:    Chief Complaint   Patient presents with    Abdominal Pain     pt c/o abdominal pain, bloating, trouble taking a breath, and trouble eating     Subjective:  Patient is much more comfortable today; states the pain meds are helping a lot. Denies fevers, chills, sweats, diarrhea. 12 point ROS negative other than mentioned above     Medications:  Reviewed    Infusion Medications     Scheduled Medications    guaiFENesin  600 mg Oral BID    cefTRIAXone (ROCEPHIN) IV  1 g Intravenous Q24H    sodium chloride flush  10 mL Intravenous 2 times per day    docusate sodium  100 mg Oral BID    enoxaparin  40 mg Subcutaneous Daily    nicotine  1 patch Transdermal Daily    pantoprazole  40 mg Oral Daily    famotidine  40 mg Oral QPM     PRN Meds: morphine, Morphine Sulfate (Concentrate), ketorolac, bisacodyl, sodium chloride flush, magnesium hydroxide, ondansetron      Intake/Output Summary (Last 24 hours) at 03/14/18 1354  Last data filed at 03/14/18 1000   Gross per 24 hour   Intake             1018 ml   Output              900 ml   Net              118 ml       Exam:    BP (!) 158/64   Pulse 97   Temp 98.1 °F (36.7 °C) (Oral)   Resp 18   Ht 5' 1\" (1.549 m)   Wt 211 lb 6.7 oz (95.9 kg)   SpO2 95%   BMI 39.95 kg/m²     General appearance: No apparent distress, appears stated age and cooperative. HEENT: Pupils equal, round, and reactive to light. Scleral icterus present  Neck: Supple, with full range of motion. No jugular venous distention. Trachea midline. Respiratory:  Normal respiratory effort. Clear to auscultation, bilaterally without Rales/Wheezes/Rhonchi. Cardiovascular: Regular rate and rhythm with normal S1/S2 without murmurs, rubs or gallops. Abdomen: Soft, mildly tender, distended with dullness in flanks  Musculoskeletal: No clubbing, cyanosis or edema bilaterally. Neuro:  Non Focal  Capillary Refill: Brisk,< 3 seconds   Peripheral Pulses: +2 palpable, equal bilaterally     Labs:   Recent Labs      03/12/18   0842  03/13/18   0617  03/14/18   0553   WBC  9.3  8.0  10.5   HGB  14.1  13.3  13.5   HCT  41.0  39.4  40.5   PLT  99*  91*  97*     Recent Labs      03/12/18   0842  03/13/18   0617  03/14/18   0553   NA  126*  129*  130*   K  5.0  4.8  5.6*   CL  88*  93*  93*   CO2  17*  16*  12*   BUN  13  14  19   CREATININE  0.47*  0.46*  0.46*   CALCIUM  8.2*  8.4*  9.1     Recent Labs      03/12/18   0842  03/13/18   0617  03/14/18   0553   AST  436*  414*  462*   ALT  103*  92*  98*   BILIDIR  19.6*   --    --    BILITOT  25.4*  26.1*  31.5*   ALKPHOS  308*  266*  273*     Recent Labs      03/11/18   1730  03/12/18   1412   INR  2.3  2.7     Recent Labs      03/11/18   1730   CKTOTAL  134       Urinalysis:      Lab Results   Component Value Date    NITRU POSITIVE 03/11/2018    WBCUA 10-20 03/11/2018    BACTERIA Moderate 03/11/2018    RBCUA 0-2 03/11/2018    BLOODU Negative 03/11/2018    SPECGRAV 1.027 03/11/2018    GLUCOSEU Negative 03/11/2018     Radiology:  XR Acute Abd Series Chest 1 VW   Final Result   1. NO EVIDENCE OF ACTIVE DISEASE IN THE CHEST. 2.  NONOBSTRUCTIVE ABDOMINAL BOWEL GAS PATTERN AND NO PNEUMOPERITONEUM. Assessment/Plan:    #Decompensated Hepatic cirrhosis with jaundice and metastatic Adenocarcinoma       - Patients family has changed their mind in regards to hospice. The patients son states that he had a friend with the exact same issue in regards to cirrhosis and cancer and that at [facility] they were able to fight it so they would like to transfer to [facility] for a second opinion       - I will respect the patients and her families wishes for transfer and a second opinion.         - Readdressed code status and now they would like her to be DNC-CCA         #Constipation       - Resolved    #? UTI       - Will err on the side of caution and continue treatment given her lactic acidosis and continue IV Abx with CTX    #Lactic acidosis       - likely due to the bulky tumor; will stop trending    Active Hospital Problems    Diagnosis Date Noted    Acute neoplasm-related pain [G89.3] 03/13/2018    Metastatic adenocarcinoma to liver ([facility] [address] [C78.7] 03/13/2018    Hyponatremia [E87.1] 03/11/2018    Liver mass [R16.0] 03/01/2018     Additional work up or/and treatment plan may be added today or then after based on clinical progression. I am managing a portion of pt care. Some medical issues are handled by other specialists. Additional work up and treatment should be done in out pt setting by pt PCP and other out pt providers. In addition to examining and evaluating pt, I spent additional time explaining care, normal and abnormal findings, and treatment plan. All of pt questions were answered. Counseling, diet and education were  provided. Case will be discussed with nursing staff when appropriate. Family will be updated if and when appropriate.       Diet: DIET CARDIAC;    Code Status: DNR-CCA    Transfer today    Electronically signed by Rashawn Silveira MD on 3/14/2018 at 1:54 PM

## 2018-03-14 NOTE — CONSULTS
left breast.  She has hepatosplenomegaly with  liver cirrhosis and multiple hepatic lesions identified with mesenteric  adenopathy that has been identified. She had enlarged left axillary lymph  nodes and also other sizes of lymph nodes were then identified. She has  not tolerated the tramadol, not helping the pain. She had consulted me  when I was in the hospital for addressing the patient's pain. According to  the Oncology note, there is thinking that the left breast mass is causing  this issue as well as she has metastatic liver disease. Her past medical history, surgical history, family history, and social  history they have been all reviewed, identified, and transcribed in my  attached EPIC note; history of obesity and liver cirrhosis, which is  current; and the breast cancer; and lung nodule that has been under workup. She has a surgical history of benign ovarian mass removed, tonsillectomy,  total abdominal hysterectomy, facetectomy, and carpal tunnel release by Dr. Lucero Estrella. She has a family history positive for diabetes mellitus, kidney disease. The substance abuse history is negative. She denies any history of drug  abuse. However, she has been current smoker for 30 years plus. REVIEW OF SYSTEMS:  Positive for severe pain and abdominal distress. The  12-system review has been identified with what is noted in the H and P  including the severe abdominal pain, jaundice throughout the whole body,  and underlying fatigue and pain. On review of patient's lab and workup, they are all transcribed in my  attached EPIC note. Her radiology identified the nonobstructive abdominal  gas pattern. However, the CAT scan has been extensively reviewed and it  showed multiple spots in the liver and mesenteric lymphadenopathy. Her lab  workup showed elevated liver function test abnormality in her electrolytes. She is still holding good hemoglobin and H and H levels.     PHYSICAL EXAMINATION:  VITAL SIGNS:  Showed blood pressure 150/76, pulse 101, and her saturation  was 100%. The pain level is 10. Temperature is 97.5. GENERAL:  She seems to be alert and oriented x3. She is in severe distress  because of the pain. She was screaming because of the pain. She has only  been receiving Toradol. HEAD AND NECK:  Normocephalic and atraumatic. She is very icteric. NECK:  Enlarged lymphadenopathy noted. LUNGS:  Decreased air entry. ABDOMEN:  Severely tender, generalized all over. EXTREMITIES:  Lower extremity exam shows no edema. HEART:  Regular rate and rhythm, tachycardic because of the pain. NEUROLOGICAL EXAM:  The gait is not examined. I do see any central  neurological deficit. ASSESSMENT AND PLAN:  The patient is 59-year-old female who has metastatic  liver cancer. possibly, the source in the liver is coming from the breast.   She is a poor candidate for chemotherapy according to the liver  dysfunction; and according to  _____'s note, it is unfortunately placed  that she has a poor prognosis. At this time, the discussion is aggressive pain management until hospice  has been identified, and at that time I realized that due to the rapidly  progressive disease, I would start the patient on morphine/Roxanol 20 mg/mL  to take one 1 mL every four hours as needed for comfort care. I do also  believe that titrating IV morphine and Dilaudid will be appropriate for her  at this time as well as low-dose Toradol to help the inflammatory pain  associated with that condition since she has been holding the liver  function. Discussed the treatment plan with family, patient, nursing team,  admission staff; and all understand the treatment plan. Thank you Dr. Minda Cortes for referral.  Contact me if you have any questions or  concerns about her care.         Cristela Sousa MD    D: 03/13/2018 19:09:12       T: 03/13/2018 23:35:44     [email or web address]  Job#: [id number]     Doc#: [id number]    CC:

## 2018-03-15 NOTE — PROGRESS NOTES
Unable to rouse patient enough to take morning meds. Will open eyes minimally to loud verbal stimulation, but does not respond or stay alert. Dr Giovany Tay notified of this with Critical Labs.

## 2018-03-15 NOTE — PROGRESS NOTES
Brisk,< 3 seconds   Peripheral Pulses: +2 palpable, equal bilaterally     Labs:   Recent Labs      03/13/18 0617 03/14/18   0553  03/15/18   0618   WBC  8.0  10.5  13.4*   HGB  13.3  13.5  13.3   HCT  39.4  40.5  40.6   PLT  91*  97*  115*     Recent Labs      03/13/18 0617 03/14/18   0553  03/15/18   0618   NA  129*  130*  131*   K  4.8  5.6*  6.6*   CL  93*  93*  90*   CO2  16*  12*  9*   BUN  14  19  34*   CREATININE  0.46*  0.46*  0.75   CALCIUM  8.4*  9.1  10.1     Recent Labs      03/13/18 0617 03/14/18 0553  03/15/18   0618   AST  414*  462*  923*   ALT  92*  98*  178*   BILITOT  26.1*  31.5*  37.2*   ALKPHOS  266*  273*  323*     No results for input(s): INR in the last 72 hours. No results for input(s): [facility] [employer] in the last 72 hours. Urinalysis:      Lab Results   Component Value Date    NITRU POSITIVE 03/11/2018    WBCUA 10-20 03/11/2018    BACTERIA Moderate 03/11/2018    RBCUA 0-2 03/11/2018    BLOODU Negative 03/11/2018    SPECGRAV 1.027 03/11/2018    GLUCOSEU Negative 03/11/2018     Radiology:  XR Acute Abd Series Chest 1 VW   Final Result   1. NO EVIDENCE OF ACTIVE DISEASE IN THE CHEST. 2.  NONOBSTRUCTIVE ABDOMINAL BOWEL GAS PATTERN AND NO PNEUMOPERITONEUM. Assessment/Plan:    #Decompensated Hepatic cirrhosis with jaundice and metastatic Adenocarcinoma       - Plan for home hospice today         #Constipation       - Resolved    #? UTI       - Will err on the side of caution and continue treatment given her lactic acidosis and continue IV Abx with CTX    #Lactic acidosis       - likely due to the bulky tumor; will stop trending    Active Hospital Problems    Diagnosis Date Noted    Acute neoplasm-related pain [G89.3] 03/13/2018    Metastatic adenocarcinoma to liver ([facility] [address] [C78.7] 03/13/2018    Hyponatremia [E87.1] 03/11/2018    Liver mass [R16.0] 03/01/2018     Additional work up or/and treatment plan may be added today or then after based on clinical progression.  I

## 2018-03-15 NOTE — PROGRESS NOTES
0900 Hospice referral nurse to remeet with pt and family today at [phone] to get consents signed and order comfort meds. [facility]  ordered for today at 1700. DME has been ordered for delivery to home this AM. Pts dtr- Ismael Novak aware of plan and in agreement. Care Coordinator- Johanna Saab updated on plan.

## 2018-03-16 NOTE — DISCHARGE SUMMARY
daily             nicotine (NICODERM CQ) 21 MG/24HR  Place 1 patch onto the skin daily             ondansetron (ZOFRAN-ODT) 4 MG disintegrating tablet  4 mg every 8 hours as needed              pantoprazole (PROTONIX) 40 MG tablet  Take 40 mg by mouth daily                 Disposition:   Home with hospice    Condition at discharge: Hospice appropriate    Activity: bedrest    Total time taken for discharging this patient: 40 minutes. Greater than 70% of time was spent focused exclusively on this patient. Time was taken to review chart, discuss plans with consultants, reconciling medications, discussing plan answering questions with patient.      Sp Webb  3/16/2018, 3:11 PM  ----------------------------------------------------------------------------------------------------------------------    Christine Martínez

## 2018-03-17 LAB
BLOOD CULTURE, ROUTINE: NORMAL
CULTURE, BLOOD 2: NORMAL